# Patient Record
Sex: MALE | Race: WHITE | Employment: UNEMPLOYED | URBAN - METROPOLITAN AREA
[De-identification: names, ages, dates, MRNs, and addresses within clinical notes are randomized per-mention and may not be internally consistent; named-entity substitution may affect disease eponyms.]

---

## 2017-01-01 ENCOUNTER — ANESTHESIA EVENT (OUTPATIENT)
Dept: SURGERY | Facility: HOSPITAL | Age: 62
DRG: 617 | End: 2017-01-01
Payer: MEDICAID

## 2017-01-01 ENCOUNTER — TELEPHONE (OUTPATIENT)
Dept: ENDOCRINOLOGY CLINIC | Facility: CLINIC | Age: 62
End: 2017-01-01

## 2017-01-01 ENCOUNTER — HOSPITAL ENCOUNTER (OUTPATIENT)
Dept: ENDOCRINOLOGY | Facility: HOSPITAL | Age: 62
Discharge: HOME | End: 2017-01-01
Attending: INTERNAL MEDICINE
Payer: MEDICAID

## 2017-01-01 ENCOUNTER — HOSPITAL ENCOUNTER (INPATIENT)
Facility: HOSPITAL | Age: 62
LOS: 17 days | Discharge: NURSING, REHAB OR LONG-TERM CARE | DRG: 617 | End: 2017-01-01
Attending: INTERNAL MEDICINE | Admitting: INTERNAL MEDICINE
Payer: MEDICAID

## 2017-01-01 ENCOUNTER — OFFICE VISIT (OUTPATIENT)
Dept: ENDOCRINOLOGY CLINIC | Facility: CLINIC | Age: 62
End: 2017-01-01

## 2017-01-01 ENCOUNTER — TELEPHONE (OUTPATIENT)
Dept: PULMONOLOGY | Facility: CLINIC | Age: 62
End: 2017-01-01

## 2017-01-01 ENCOUNTER — HOSPITAL ENCOUNTER (INPATIENT)
Facility: HOSPITAL | Age: 62
LOS: 4 days | Discharge: HOME | DRG: 638 | End: 2017-01-01
Attending: EMERGENCY MEDICINE | Admitting: INTERNAL MEDICINE
Payer: MEDICAID

## 2017-01-01 ENCOUNTER — APPOINTMENT (OUTPATIENT)
Dept: LAB | Facility: HOSPITAL | Age: 62
End: 2017-01-01
Attending: INTERNAL MEDICINE
Payer: MEDICAID

## 2017-01-01 ENCOUNTER — TELEPHONE (OUTPATIENT)
Dept: CARDIOLOGY UNIT | Facility: HOSPITAL | Age: 62
End: 2017-01-01

## 2017-01-01 ENCOUNTER — APPOINTMENT (OUTPATIENT)
Dept: GENERAL RADIOLOGY | Facility: HOSPITAL | Age: 62
DRG: 617 | End: 2017-01-01
Attending: INTERNAL MEDICINE
Payer: MEDICAID

## 2017-01-01 ENCOUNTER — APPOINTMENT (OUTPATIENT)
Dept: MRI IMAGING | Facility: HOSPITAL | Age: 62
DRG: 617 | End: 2017-01-01
Attending: INTERNAL MEDICINE
Payer: MEDICAID

## 2017-01-01 ENCOUNTER — OFFICE VISIT (OUTPATIENT)
Dept: PULMONOLOGY | Facility: CLINIC | Age: 62
End: 2017-01-01

## 2017-01-01 ENCOUNTER — ANESTHESIA (OUTPATIENT)
Dept: SURGERY | Facility: HOSPITAL | Age: 62
DRG: 617 | End: 2017-01-01
Payer: MEDICAID

## 2017-01-01 ENCOUNTER — NURSE ONLY (OUTPATIENT)
Dept: ENDOCRINOLOGY CLINIC | Facility: CLINIC | Age: 62
End: 2017-01-01

## 2017-01-01 ENCOUNTER — SURGERY (OUTPATIENT)
Age: 62
End: 2017-01-01

## 2017-01-01 ENCOUNTER — APPOINTMENT (OUTPATIENT)
Dept: GENERAL RADIOLOGY | Facility: HOSPITAL | Age: 62
DRG: 617 | End: 2017-01-01
Attending: CLINICAL NURSE SPECIALIST
Payer: MEDICAID

## 2017-01-01 VITALS
BODY MASS INDEX: 18.51 KG/M2 | DIASTOLIC BLOOD PRESSURE: 73 MMHG | HEIGHT: 78 IN | HEART RATE: 85 BPM | SYSTOLIC BLOOD PRESSURE: 133 MMHG | WEIGHT: 160 LBS

## 2017-01-01 VITALS
BODY MASS INDEX: 18.82 KG/M2 | SYSTOLIC BLOOD PRESSURE: 111 MMHG | DIASTOLIC BLOOD PRESSURE: 56 MMHG | OXYGEN SATURATION: 97 % | HEIGHT: 78 IN | HEART RATE: 84 BPM | RESPIRATION RATE: 20 BRPM | WEIGHT: 162.69 LBS | TEMPERATURE: 98 F

## 2017-01-01 VITALS — HEIGHT: 78 IN | BODY MASS INDEX: 16.78 KG/M2 | WEIGHT: 145 LBS

## 2017-01-01 VITALS
HEART RATE: 94 BPM | HEIGHT: 78 IN | BODY MASS INDEX: 19.16 KG/M2 | WEIGHT: 165.63 LBS | DIASTOLIC BLOOD PRESSURE: 58 MMHG | RESPIRATION RATE: 18 BRPM | OXYGEN SATURATION: 98 % | SYSTOLIC BLOOD PRESSURE: 112 MMHG

## 2017-01-01 VITALS
DIASTOLIC BLOOD PRESSURE: 59 MMHG | HEIGHT: 78 IN | WEIGHT: 145 LBS | HEART RATE: 87 BPM | RESPIRATION RATE: 16 BRPM | BODY MASS INDEX: 16.78 KG/M2 | TEMPERATURE: 98 F | SYSTOLIC BLOOD PRESSURE: 127 MMHG | OXYGEN SATURATION: 97 %

## 2017-01-01 VITALS
BODY MASS INDEX: 18.03 KG/M2 | WEIGHT: 155.81 LBS | OXYGEN SATURATION: 99 % | HEIGHT: 78 IN | RESPIRATION RATE: 19 BRPM | HEART RATE: 65 BPM | DIASTOLIC BLOOD PRESSURE: 68 MMHG | SYSTOLIC BLOOD PRESSURE: 111 MMHG

## 2017-01-01 VITALS
HEIGHT: 78 IN | DIASTOLIC BLOOD PRESSURE: 53 MMHG | RESPIRATION RATE: 18 BRPM | HEART RATE: 96 BPM | SYSTOLIC BLOOD PRESSURE: 100 MMHG | OXYGEN SATURATION: 97 %

## 2017-01-01 VITALS — BODY MASS INDEX: 18 KG/M2 | WEIGHT: 156.88 LBS

## 2017-01-01 VITALS — WEIGHT: 155.81 LBS | HEIGHT: 78 IN | BODY MASS INDEX: 18.03 KG/M2

## 2017-01-01 VITALS
BODY MASS INDEX: 16.78 KG/M2 | DIASTOLIC BLOOD PRESSURE: 68 MMHG | SYSTOLIC BLOOD PRESSURE: 115 MMHG | HEIGHT: 78 IN | WEIGHT: 145 LBS | HEART RATE: 111 BPM

## 2017-01-01 DIAGNOSIS — E10.649 UNCONTROLLED TYPE 1 DIABETES MELLITUS WITH HYPOGLYCEMIA WITHOUT COMA (HCC): ICD-10-CM

## 2017-01-01 DIAGNOSIS — Z12.5 SCREENING FOR PROSTATE CANCER: ICD-10-CM

## 2017-01-01 DIAGNOSIS — E05.90 HYPERTHYROIDISM: ICD-10-CM

## 2017-01-01 DIAGNOSIS — R79.89 ELEVATED TSH: Primary | ICD-10-CM

## 2017-01-01 DIAGNOSIS — L97.419 DIABETIC ULCER OF RIGHT MIDFOOT ASSOCIATED WITH TYPE 1 DIABETES MELLITUS, UNSPECIFIED ULCER STAGE (HCC): Primary | ICD-10-CM

## 2017-01-01 DIAGNOSIS — E10.65 TYPE 1 DIABETES MELLITUS WITH HYPERGLYCEMIA (HCC): Primary | ICD-10-CM

## 2017-01-01 DIAGNOSIS — E10.621 DIABETIC ULCER OF RIGHT MIDFOOT ASSOCIATED WITH TYPE 1 DIABETES MELLITUS, UNSPECIFIED ULCER STAGE (HCC): Primary | ICD-10-CM

## 2017-01-01 DIAGNOSIS — E10.10 TYPE 1 DIABETES MELLITUS WITH KETOACIDOSIS WITHOUT COMA (HCC): ICD-10-CM

## 2017-01-01 DIAGNOSIS — E10.65 TYPE 1 DIABETES MELLITUS WITH HYPERGLYCEMIA (HCC): ICD-10-CM

## 2017-01-01 DIAGNOSIS — R79.89 ELEVATED TSH: ICD-10-CM

## 2017-01-01 DIAGNOSIS — E05.90 HYPERTHYROIDISM: Primary | ICD-10-CM

## 2017-01-01 DIAGNOSIS — R73.9 HYPERGLYCEMIA: Primary | ICD-10-CM

## 2017-01-01 DIAGNOSIS — E11.621 DIABETIC ULCER OF RIGHT MIDFOOT ASSOCIATED WITH TYPE 2 DIABETES MELLITUS, UNSPECIFIED ULCER STAGE (HCC): Primary | ICD-10-CM

## 2017-01-01 DIAGNOSIS — L97.419 DIABETIC ULCER OF RIGHT MIDFOOT ASSOCIATED WITH TYPE 2 DIABETES MELLITUS, UNSPECIFIED ULCER STAGE (HCC): Primary | ICD-10-CM

## 2017-01-01 DIAGNOSIS — E10.649 TYPE 1 DIABETES MELLITUS WITH HYPOGLYCEMIA AND WITHOUT COMA (HCC): Primary | ICD-10-CM

## 2017-01-01 DIAGNOSIS — E11.65 UNCONTROLLED TYPE 2 DIABETES MELLITUS WITH HYPERGLYCEMIA, WITH LONG-TERM CURRENT USE OF INSULIN (HCC): Primary | ICD-10-CM

## 2017-01-01 DIAGNOSIS — E11.65 UNCONTROLLED TYPE 2 DIABETES MELLITUS WITH HYPERGLYCEMIA, WITH LONG-TERM CURRENT USE OF INSULIN (HCC): ICD-10-CM

## 2017-01-01 DIAGNOSIS — E10.65 UNCONTROLLED TYPE 1 DIABETES MELLITUS WITH HYPERGLYCEMIA (HCC): Primary | ICD-10-CM

## 2017-01-01 DIAGNOSIS — Z79.4 UNCONTROLLED TYPE 2 DIABETES MELLITUS WITH HYPERGLYCEMIA, WITH LONG-TERM CURRENT USE OF INSULIN (HCC): ICD-10-CM

## 2017-01-01 DIAGNOSIS — E03.9 HYPOTHYROIDISM, UNSPECIFIED TYPE: Primary | ICD-10-CM

## 2017-01-01 DIAGNOSIS — Z79.4 UNCONTROLLED TYPE 2 DIABETES MELLITUS WITH HYPERGLYCEMIA, WITH LONG-TERM CURRENT USE OF INSULIN (HCC): Primary | ICD-10-CM

## 2017-01-01 DIAGNOSIS — M86.171: ICD-10-CM

## 2017-01-01 DIAGNOSIS — E03.9 HYPOTHYROIDISM, UNSPECIFIED TYPE: ICD-10-CM

## 2017-01-01 LAB
ALBUMIN SERPL BCP-MCNC: 2 G/DL (ref 3.5–4.8)
ALBUMIN/GLOB SERPL: 0.5 {RATIO} (ref 1–2)
ALP SERPL-CCNC: 176 U/L (ref 32–100)
ALT SERPL-CCNC: 14 U/L (ref 17–63)
ANION GAP SERPL CALC-SCNC: 12 MMOL/L (ref 0–18)
ANION GAP SERPL CALC-SCNC: 15 MMOL/L (ref 0–18)
ANION GAP SERPL CALC-SCNC: 21 MMOL/L (ref 0–18)
ANION GAP SERPL CALC-SCNC: 5 MMOL/L (ref 0–18)
ANION GAP SERPL CALC-SCNC: 7 MMOL/L (ref 0–18)
ANION GAP SERPL CALC-SCNC: 7 MMOL/L (ref 0–18)
ANION GAP SERPL CALC-SCNC: 8 MMOL/L (ref 0–18)
AST SERPL-CCNC: 23 U/L (ref 15–41)
BASOPHILS # BLD: 0 K/UL (ref 0–0.2)
BASOPHILS # BLD: 0.1 K/UL (ref 0–0.2)
BASOPHILS # BLD: 0.2 K/UL (ref 0–0.2)
BASOPHILS # BLD: 0.2 K/UL (ref 0–0.2)
BASOPHILS NFR BLD: 0 %
BASOPHILS NFR BLD: 1 %
BILIRUB SERPL-MCNC: 1 MG/DL (ref 0.3–1.2)
BUN SERPL-MCNC: 14 MG/DL (ref 8–20)
BUN SERPL-MCNC: 14 MG/DL (ref 8–20)
BUN SERPL-MCNC: 15 MG/DL (ref 8–20)
BUN SERPL-MCNC: 17 MG/DL (ref 8–20)
BUN SERPL-MCNC: 18 MG/DL (ref 8–20)
BUN SERPL-MCNC: 19 MG/DL (ref 8–20)
BUN SERPL-MCNC: 21 MG/DL (ref 8–20)
BUN SERPL-MCNC: 22 MG/DL (ref 8–20)
BUN SERPL-MCNC: 23 MG/DL (ref 8–20)
BUN SERPL-MCNC: 32 MG/DL (ref 8–20)
BUN SERPL-MCNC: 37 MG/DL (ref 8–20)
BUN/CREAT SERPL: 15.3 (ref 10–20)
BUN/CREAT SERPL: 15.8 (ref 10–20)
BUN/CREAT SERPL: 17.9 (ref 10–20)
BUN/CREAT SERPL: 18.5 (ref 10–20)
BUN/CREAT SERPL: 18.9 (ref 10–20)
BUN/CREAT SERPL: 19.4 (ref 10–20)
BUN/CREAT SERPL: 19.5 (ref 10–20)
BUN/CREAT SERPL: 20.5 (ref 10–20)
BUN/CREAT SERPL: 21.6 (ref 10–20)
BUN/CREAT SERPL: 22.9 (ref 10–20)
BUN/CREAT SERPL: 23.4 (ref 10–20)
BUN/CREAT SERPL: 25 (ref 10–20)
BUN/CREAT SERPL: 29.8 (ref 10–20)
CALCIUM SERPL-MCNC: 7.8 MG/DL (ref 8.5–10.5)
CALCIUM SERPL-MCNC: 8 MG/DL (ref 8.5–10.5)
CALCIUM SERPL-MCNC: 8 MG/DL (ref 8.5–10.5)
CALCIUM SERPL-MCNC: 8.1 MG/DL (ref 8.5–10.5)
CALCIUM SERPL-MCNC: 8.2 MG/DL (ref 8.5–10.5)
CALCIUM SERPL-MCNC: 8.3 MG/DL (ref 8.5–10.5)
CALCIUM SERPL-MCNC: 8.7 MG/DL (ref 8.5–10.5)
CALCIUM SERPL-MCNC: 9.1 MG/DL (ref 8.5–10.5)
CALCIUM SERPL-MCNC: 9.2 MG/DL (ref 8.5–10.5)
CHLORIDE SERPL-SCNC: 100 MMOL/L (ref 95–110)
CHLORIDE SERPL-SCNC: 100 MMOL/L (ref 95–110)
CHLORIDE SERPL-SCNC: 102 MMOL/L (ref 95–110)
CHLORIDE SERPL-SCNC: 110 MMOL/L (ref 95–110)
CHLORIDE SERPL-SCNC: 95 MMOL/L (ref 95–110)
CHLORIDE SERPL-SCNC: 96 MMOL/L (ref 95–110)
CHLORIDE SERPL-SCNC: 98 MMOL/L (ref 95–110)
CHLORIDE SERPL-SCNC: 99 MMOL/L (ref 95–110)
CO2 SERPL-SCNC: 12 MMOL/L (ref 22–32)
CO2 SERPL-SCNC: 15 MMOL/L (ref 22–32)
CO2 SERPL-SCNC: 25 MMOL/L (ref 22–32)
CO2 SERPL-SCNC: 25 MMOL/L (ref 22–32)
CO2 SERPL-SCNC: 28 MMOL/L (ref 22–32)
CO2 SERPL-SCNC: 28 MMOL/L (ref 22–32)
CO2 SERPL-SCNC: 29 MMOL/L (ref 22–32)
CO2 SERPL-SCNC: 29 MMOL/L (ref 22–32)
CO2 SERPL-SCNC: 30 MMOL/L (ref 22–32)
CO2 SERPL-SCNC: 31 MMOL/L (ref 22–32)
CO2 SERPL-SCNC: 31 MMOL/L (ref 22–32)
CO2 SERPL-SCNC: 32 MMOL/L (ref 22–32)
CO2 SERPL-SCNC: 33 MMOL/L (ref 22–32)
CREAT SERPL-MCNC: 0.72 MG/DL (ref 0.5–1.5)
CREAT SERPL-MCNC: 0.73 MG/DL (ref 0.5–1.5)
CREAT SERPL-MCNC: 0.74 MG/DL (ref 0.5–1.5)
CREAT SERPL-MCNC: 0.77 MG/DL (ref 0.5–1.5)
CREAT SERPL-MCNC: 0.83 MG/DL (ref 0.5–1.5)
CREAT SERPL-MCNC: 0.84 MG/DL (ref 0.5–1.5)
CREAT SERPL-MCNC: 0.87 MG/DL (ref 0.5–1.5)
CREAT SERPL-MCNC: 0.88 MG/DL (ref 0.5–1.5)
CREAT SERPL-MCNC: 0.98 MG/DL (ref 0.5–1.5)
CREAT SERPL-MCNC: 1.24 MG/DL (ref 0.5–1.5)
CREAT SERPL-MCNC: 1.24 MG/DL (ref 0.5–1.5)
CREAT SERPL-MCNC: 1.33 MG/DL (ref 0.5–1.5)
CREAT SERPL-MCNC: 1.48 MG/DL (ref 0.5–1.5)
CREAT UR-MCNC: 29 MG/DL
EOSINOPHIL # BLD: 0 K/UL (ref 0–0.7)
EOSINOPHIL # BLD: 0.1 K/UL (ref 0–0.7)
EOSINOPHIL # BLD: 0.2 K/UL (ref 0–0.7)
EOSINOPHIL # BLD: 0.3 K/UL (ref 0–0.7)
EOSINOPHIL # BLD: 0.3 K/UL (ref 0–0.7)
EOSINOPHIL # BLD: 0.4 K/UL (ref 0–0.7)
EOSINOPHIL # BLD: 0.5 K/UL (ref 0–0.7)
EOSINOPHIL # BLD: 0.6 K/UL (ref 0–0.7)
EOSINOPHIL NFR BLD: 0 %
EOSINOPHIL NFR BLD: 1 %
EOSINOPHIL NFR BLD: 2 %
EOSINOPHIL NFR BLD: 3 %
EOSINOPHIL NFR BLD: 4 %
EOSINOPHIL NFR BLD: 4 %
EOSINOPHIL NFR BLD: 8 %
ERYTHROCYTE [DISTWIDTH] IN BLOOD BY AUTOMATED COUNT: 13.7 % (ref 11–15)
ERYTHROCYTE [DISTWIDTH] IN BLOOD BY AUTOMATED COUNT: 13.9 % (ref 11–15)
ERYTHROCYTE [DISTWIDTH] IN BLOOD BY AUTOMATED COUNT: 14 % (ref 11–15)
ERYTHROCYTE [DISTWIDTH] IN BLOOD BY AUTOMATED COUNT: 14.1 % (ref 11–15)
ERYTHROCYTE [DISTWIDTH] IN BLOOD BY AUTOMATED COUNT: 14.1 % (ref 11–15)
ERYTHROCYTE [DISTWIDTH] IN BLOOD BY AUTOMATED COUNT: 14.2 % (ref 11–15)
ERYTHROCYTE [DISTWIDTH] IN BLOOD BY AUTOMATED COUNT: 14.3 % (ref 11–15)
ERYTHROCYTE [DISTWIDTH] IN BLOOD BY AUTOMATED COUNT: 14.4 % (ref 11–15)
ERYTHROCYTE [DISTWIDTH] IN BLOOD BY AUTOMATED COUNT: 14.4 % (ref 11–15)
ERYTHROCYTE [DISTWIDTH] IN BLOOD BY AUTOMATED COUNT: 14.5 % (ref 11–15)
ERYTHROCYTE [DISTWIDTH] IN BLOOD BY AUTOMATED COUNT: 14.8 % (ref 11–15)
ERYTHROCYTE [DISTWIDTH] IN BLOOD BY AUTOMATED COUNT: 15.2 % (ref 11–15)
GLOBULIN PLAS-MCNC: 4.1 G/DL (ref 2.5–3.7)
GLUCOSE BLDC GLUCOMTR-MCNC: 101 MG/DL (ref 70–99)
GLUCOSE BLDC GLUCOMTR-MCNC: 105 MG/DL (ref 70–99)
GLUCOSE BLDC GLUCOMTR-MCNC: 105 MG/DL (ref 70–99)
GLUCOSE BLDC GLUCOMTR-MCNC: 106 MG/DL (ref 70–99)
GLUCOSE BLDC GLUCOMTR-MCNC: 109 MG/DL (ref 70–99)
GLUCOSE BLDC GLUCOMTR-MCNC: 112 MG/DL (ref 70–99)
GLUCOSE BLDC GLUCOMTR-MCNC: 115 MG/DL (ref 70–99)
GLUCOSE BLDC GLUCOMTR-MCNC: 115 MG/DL (ref 70–99)
GLUCOSE BLDC GLUCOMTR-MCNC: 116 MG/DL (ref 70–99)
GLUCOSE BLDC GLUCOMTR-MCNC: 117 MG/DL (ref 70–99)
GLUCOSE BLDC GLUCOMTR-MCNC: 122 MG/DL (ref 70–99)
GLUCOSE BLDC GLUCOMTR-MCNC: 124 MG/DL (ref 70–99)
GLUCOSE BLDC GLUCOMTR-MCNC: 125 MG/DL (ref 70–99)
GLUCOSE BLDC GLUCOMTR-MCNC: 126 MG/DL (ref 70–99)
GLUCOSE BLDC GLUCOMTR-MCNC: 126 MG/DL (ref 70–99)
GLUCOSE BLDC GLUCOMTR-MCNC: 127 MG/DL (ref 70–99)
GLUCOSE BLDC GLUCOMTR-MCNC: 127 MG/DL (ref 70–99)
GLUCOSE BLDC GLUCOMTR-MCNC: 129 MG/DL (ref 70–99)
GLUCOSE BLDC GLUCOMTR-MCNC: 129 MG/DL (ref 70–99)
GLUCOSE BLDC GLUCOMTR-MCNC: 131 MG/DL (ref 70–99)
GLUCOSE BLDC GLUCOMTR-MCNC: 133 MG/DL (ref 70–99)
GLUCOSE BLDC GLUCOMTR-MCNC: 134 MG/DL (ref 70–99)
GLUCOSE BLDC GLUCOMTR-MCNC: 135 MG/DL (ref 70–99)
GLUCOSE BLDC GLUCOMTR-MCNC: 136 MG/DL (ref 70–99)
GLUCOSE BLDC GLUCOMTR-MCNC: 137 MG/DL (ref 70–99)
GLUCOSE BLDC GLUCOMTR-MCNC: 137 MG/DL (ref 70–99)
GLUCOSE BLDC GLUCOMTR-MCNC: 140 MG/DL (ref 70–99)
GLUCOSE BLDC GLUCOMTR-MCNC: 141 MG/DL (ref 70–99)
GLUCOSE BLDC GLUCOMTR-MCNC: 142 MG/DL (ref 70–99)
GLUCOSE BLDC GLUCOMTR-MCNC: 143 MG/DL (ref 70–99)
GLUCOSE BLDC GLUCOMTR-MCNC: 143 MG/DL (ref 70–99)
GLUCOSE BLDC GLUCOMTR-MCNC: 144 MG/DL (ref 70–99)
GLUCOSE BLDC GLUCOMTR-MCNC: 146 MG/DL (ref 70–99)
GLUCOSE BLDC GLUCOMTR-MCNC: 147 MG/DL (ref 70–99)
GLUCOSE BLDC GLUCOMTR-MCNC: 147 MG/DL (ref 70–99)
GLUCOSE BLDC GLUCOMTR-MCNC: 148 MG/DL (ref 70–99)
GLUCOSE BLDC GLUCOMTR-MCNC: 149 MG/DL (ref 70–99)
GLUCOSE BLDC GLUCOMTR-MCNC: 151 MG/DL (ref 70–99)
GLUCOSE BLDC GLUCOMTR-MCNC: 153 MG/DL (ref 70–99)
GLUCOSE BLDC GLUCOMTR-MCNC: 155 MG/DL (ref 70–99)
GLUCOSE BLDC GLUCOMTR-MCNC: 158 MG/DL (ref 70–99)
GLUCOSE BLDC GLUCOMTR-MCNC: 159 MG/DL (ref 70–99)
GLUCOSE BLDC GLUCOMTR-MCNC: 161 MG/DL (ref 70–99)
GLUCOSE BLDC GLUCOMTR-MCNC: 162 MG/DL (ref 70–99)
GLUCOSE BLDC GLUCOMTR-MCNC: 164 MG/DL (ref 70–99)
GLUCOSE BLDC GLUCOMTR-MCNC: 166 MG/DL (ref 70–99)
GLUCOSE BLDC GLUCOMTR-MCNC: 168 MG/DL (ref 70–99)
GLUCOSE BLDC GLUCOMTR-MCNC: 170 MG/DL (ref 70–99)
GLUCOSE BLDC GLUCOMTR-MCNC: 173 MG/DL (ref 70–99)
GLUCOSE BLDC GLUCOMTR-MCNC: 174 MG/DL (ref 70–99)
GLUCOSE BLDC GLUCOMTR-MCNC: 183 MG/DL (ref 70–99)
GLUCOSE BLDC GLUCOMTR-MCNC: 184 MG/DL (ref 70–99)
GLUCOSE BLDC GLUCOMTR-MCNC: 186 MG/DL (ref 70–99)
GLUCOSE BLDC GLUCOMTR-MCNC: 188 MG/DL (ref 70–99)
GLUCOSE BLDC GLUCOMTR-MCNC: 190 MG/DL (ref 70–99)
GLUCOSE BLDC GLUCOMTR-MCNC: 194 MG/DL (ref 70–99)
GLUCOSE BLDC GLUCOMTR-MCNC: 195 MG/DL (ref 70–99)
GLUCOSE BLDC GLUCOMTR-MCNC: 197 MG/DL (ref 70–99)
GLUCOSE BLDC GLUCOMTR-MCNC: 199 MG/DL (ref 70–99)
GLUCOSE BLDC GLUCOMTR-MCNC: 200 MG/DL (ref 70–99)
GLUCOSE BLDC GLUCOMTR-MCNC: 204 MG/DL (ref 70–99)
GLUCOSE BLDC GLUCOMTR-MCNC: 205 MG/DL (ref 70–99)
GLUCOSE BLDC GLUCOMTR-MCNC: 206 MG/DL (ref 70–99)
GLUCOSE BLDC GLUCOMTR-MCNC: 207 MG/DL (ref 70–99)
GLUCOSE BLDC GLUCOMTR-MCNC: 208 MG/DL (ref 70–99)
GLUCOSE BLDC GLUCOMTR-MCNC: 215 MG/DL (ref 70–99)
GLUCOSE BLDC GLUCOMTR-MCNC: 215 MG/DL (ref 70–99)
GLUCOSE BLDC GLUCOMTR-MCNC: 218 MG/DL (ref 70–99)
GLUCOSE BLDC GLUCOMTR-MCNC: 219 MG/DL (ref 70–99)
GLUCOSE BLDC GLUCOMTR-MCNC: 221 MG/DL (ref 70–99)
GLUCOSE BLDC GLUCOMTR-MCNC: 226 MG/DL (ref 70–99)
GLUCOSE BLDC GLUCOMTR-MCNC: 229 MG/DL (ref 70–99)
GLUCOSE BLDC GLUCOMTR-MCNC: 229 MG/DL (ref 70–99)
GLUCOSE BLDC GLUCOMTR-MCNC: 231 MG/DL (ref 70–99)
GLUCOSE BLDC GLUCOMTR-MCNC: 232 MG/DL (ref 70–99)
GLUCOSE BLDC GLUCOMTR-MCNC: 237 MG/DL (ref 70–99)
GLUCOSE BLDC GLUCOMTR-MCNC: 243 MG/DL (ref 70–99)
GLUCOSE BLDC GLUCOMTR-MCNC: 244 MG/DL (ref 70–99)
GLUCOSE BLDC GLUCOMTR-MCNC: 248 MG/DL (ref 70–99)
GLUCOSE BLDC GLUCOMTR-MCNC: 248 MG/DL (ref 70–99)
GLUCOSE BLDC GLUCOMTR-MCNC: 250 MG/DL (ref 70–99)
GLUCOSE BLDC GLUCOMTR-MCNC: 251 MG/DL (ref 70–99)
GLUCOSE BLDC GLUCOMTR-MCNC: 253 MG/DL (ref 70–99)
GLUCOSE BLDC GLUCOMTR-MCNC: 255 MG/DL (ref 70–99)
GLUCOSE BLDC GLUCOMTR-MCNC: 259 MG/DL (ref 70–99)
GLUCOSE BLDC GLUCOMTR-MCNC: 260 MG/DL (ref 70–99)
GLUCOSE BLDC GLUCOMTR-MCNC: 260 MG/DL (ref 70–99)
GLUCOSE BLDC GLUCOMTR-MCNC: 261 MG/DL (ref 70–99)
GLUCOSE BLDC GLUCOMTR-MCNC: 262 MG/DL (ref 70–99)
GLUCOSE BLDC GLUCOMTR-MCNC: 265 MG/DL (ref 70–99)
GLUCOSE BLDC GLUCOMTR-MCNC: 266 MG/DL (ref 70–99)
GLUCOSE BLDC GLUCOMTR-MCNC: 267 MG/DL (ref 70–99)
GLUCOSE BLDC GLUCOMTR-MCNC: 268 MG/DL (ref 70–99)
GLUCOSE BLDC GLUCOMTR-MCNC: 277 MG/DL (ref 70–99)
GLUCOSE BLDC GLUCOMTR-MCNC: 279 MG/DL (ref 70–99)
GLUCOSE BLDC GLUCOMTR-MCNC: 283 MG/DL (ref 70–99)
GLUCOSE BLDC GLUCOMTR-MCNC: 286 MG/DL (ref 70–99)
GLUCOSE BLDC GLUCOMTR-MCNC: 304 MG/DL (ref 70–99)
GLUCOSE BLDC GLUCOMTR-MCNC: 305 MG/DL (ref 70–99)
GLUCOSE BLDC GLUCOMTR-MCNC: 312 MG/DL (ref 70–99)
GLUCOSE BLDC GLUCOMTR-MCNC: 312 MG/DL (ref 70–99)
GLUCOSE BLDC GLUCOMTR-MCNC: 316 MG/DL (ref 70–99)
GLUCOSE BLDC GLUCOMTR-MCNC: 323 MG/DL (ref 70–99)
GLUCOSE BLDC GLUCOMTR-MCNC: 335 MG/DL (ref 70–99)
GLUCOSE BLDC GLUCOMTR-MCNC: 351 MG/DL (ref 70–99)
GLUCOSE BLDC GLUCOMTR-MCNC: 360 MG/DL (ref 70–99)
GLUCOSE BLDC GLUCOMTR-MCNC: 374 MG/DL (ref 70–99)
GLUCOSE BLDC GLUCOMTR-MCNC: 377 MG/DL (ref 70–99)
GLUCOSE BLDC GLUCOMTR-MCNC: 383 MG/DL (ref 70–99)
GLUCOSE BLDC GLUCOMTR-MCNC: 385 MG/DL (ref 70–99)
GLUCOSE BLDC GLUCOMTR-MCNC: 39 MG/DL (ref 70–99)
GLUCOSE BLDC GLUCOMTR-MCNC: 414 MG/DL (ref 70–99)
GLUCOSE BLDC GLUCOMTR-MCNC: 415 MG/DL (ref 70–99)
GLUCOSE BLDC GLUCOMTR-MCNC: 42 MG/DL (ref 70–99)
GLUCOSE BLDC GLUCOMTR-MCNC: 423 MG/DL (ref 70–99)
GLUCOSE BLDC GLUCOMTR-MCNC: 437 MG/DL (ref 70–99)
GLUCOSE BLDC GLUCOMTR-MCNC: 44 MG/DL (ref 70–99)
GLUCOSE BLDC GLUCOMTR-MCNC: 44 MG/DL (ref 70–99)
GLUCOSE BLDC GLUCOMTR-MCNC: 445 MG/DL (ref 70–99)
GLUCOSE BLDC GLUCOMTR-MCNC: 446 MG/DL (ref 70–99)
GLUCOSE BLDC GLUCOMTR-MCNC: 448 MG/DL (ref 70–99)
GLUCOSE BLDC GLUCOMTR-MCNC: 451 MG/DL (ref 70–99)
GLUCOSE BLDC GLUCOMTR-MCNC: 455 MG/DL (ref 70–99)
GLUCOSE BLDC GLUCOMTR-MCNC: 455 MG/DL (ref 70–99)
GLUCOSE BLDC GLUCOMTR-MCNC: 457 MG/DL (ref 70–99)
GLUCOSE BLDC GLUCOMTR-MCNC: 47 MG/DL (ref 70–99)
GLUCOSE BLDC GLUCOMTR-MCNC: 493 MG/DL (ref 70–99)
GLUCOSE BLDC GLUCOMTR-MCNC: 55 MG/DL (ref 70–99)
GLUCOSE BLDC GLUCOMTR-MCNC: 55 MG/DL (ref 70–99)
GLUCOSE BLDC GLUCOMTR-MCNC: 56 MG/DL (ref 70–99)
GLUCOSE BLDC GLUCOMTR-MCNC: 58 MG/DL (ref 70–99)
GLUCOSE BLDC GLUCOMTR-MCNC: 59 MG/DL (ref 70–99)
GLUCOSE BLDC GLUCOMTR-MCNC: 63 MG/DL (ref 70–99)
GLUCOSE BLDC GLUCOMTR-MCNC: 64 MG/DL (ref 70–99)
GLUCOSE BLDC GLUCOMTR-MCNC: 65 MG/DL (ref 70–99)
GLUCOSE BLDC GLUCOMTR-MCNC: 65 MG/DL (ref 70–99)
GLUCOSE BLDC GLUCOMTR-MCNC: 66 MG/DL (ref 70–99)
GLUCOSE BLDC GLUCOMTR-MCNC: 66 MG/DL (ref 70–99)
GLUCOSE BLDC GLUCOMTR-MCNC: 72 MG/DL (ref 70–99)
GLUCOSE BLDC GLUCOMTR-MCNC: 75 MG/DL (ref 70–99)
GLUCOSE BLDC GLUCOMTR-MCNC: 76 MG/DL (ref 70–99)
GLUCOSE BLDC GLUCOMTR-MCNC: 77 MG/DL (ref 70–99)
GLUCOSE BLDC GLUCOMTR-MCNC: 78 MG/DL (ref 70–99)
GLUCOSE BLDC GLUCOMTR-MCNC: 78 MG/DL (ref 70–99)
GLUCOSE BLDC GLUCOMTR-MCNC: 79 MG/DL (ref 70–99)
GLUCOSE BLDC GLUCOMTR-MCNC: 81 MG/DL (ref 70–99)
GLUCOSE BLDC GLUCOMTR-MCNC: 83 MG/DL (ref 70–99)
GLUCOSE BLDC GLUCOMTR-MCNC: 83 MG/DL (ref 70–99)
GLUCOSE BLDC GLUCOMTR-MCNC: 86 MG/DL (ref 70–99)
GLUCOSE BLDC GLUCOMTR-MCNC: 86 MG/DL (ref 70–99)
GLUCOSE BLDC GLUCOMTR-MCNC: 88 MG/DL (ref 70–99)
GLUCOSE BLDC GLUCOMTR-MCNC: 89 MG/DL (ref 70–99)
GLUCOSE BLDC GLUCOMTR-MCNC: 90 MG/DL (ref 70–99)
GLUCOSE BLDC GLUCOMTR-MCNC: 91 MG/DL (ref 70–99)
GLUCOSE BLDC GLUCOMTR-MCNC: 91 MG/DL (ref 70–99)
GLUCOSE BLDC GLUCOMTR-MCNC: 94 MG/DL (ref 70–99)
GLUCOSE BLDC GLUCOMTR-MCNC: 96 MG/DL (ref 70–99)
GLUCOSE BLDC GLUCOMTR-MCNC: 97 MG/DL (ref 70–99)
GLUCOSE BLDC GLUCOMTR-MCNC: 97 MG/DL (ref 70–99)
GLUCOSE BLDC GLUCOMTR-MCNC: 99 MG/DL (ref 70–99)
GLUCOSE BLDC GLUCOMTR-MCNC: >500 MG/DL (ref 70–99)
GLUCOSE SERPL-MCNC: 112 MG/DL (ref 70–99)
GLUCOSE SERPL-MCNC: 113 MG/DL (ref 70–99)
GLUCOSE SERPL-MCNC: 120 MG/DL (ref 70–99)
GLUCOSE SERPL-MCNC: 156 MG/DL (ref 70–99)
GLUCOSE SERPL-MCNC: 251 MG/DL (ref 70–99)
GLUCOSE SERPL-MCNC: 264 MG/DL (ref 70–99)
GLUCOSE SERPL-MCNC: 295 MG/DL (ref 70–99)
GLUCOSE SERPL-MCNC: 392 MG/DL (ref 70–99)
GLUCOSE SERPL-MCNC: 432 MG/DL (ref 70–99)
GLUCOSE SERPL-MCNC: 451 MG/DL (ref 70–99)
GLUCOSE SERPL-MCNC: 764 MG/DL (ref 70–99)
GLUCOSE SERPL-MCNC: 96 MG/DL (ref 70–99)
GLUCOSE SERPL-MCNC: 99 MG/DL (ref 70–99)
HBA1C MFR BLD: 10.2 % (ref 4–6)
HBA1C MFR BLD: 9.4 % (ref 4–6)
HBA1C MFR BLD: 9.9 % (ref 4–6)
HCT VFR BLD AUTO: 26.6 % (ref 41–52)
HCT VFR BLD AUTO: 26.6 % (ref 41–52)
HCT VFR BLD AUTO: 28.2 % (ref 41–52)
HCT VFR BLD AUTO: 28.3 % (ref 41–52)
HCT VFR BLD AUTO: 28.4 % (ref 41–52)
HCT VFR BLD AUTO: 29.5 % (ref 41–52)
HCT VFR BLD AUTO: 29.7 % (ref 41–52)
HCT VFR BLD AUTO: 31.1 % (ref 41–52)
HCT VFR BLD AUTO: 31.2 % (ref 41–52)
HCT VFR BLD AUTO: 31.6 % (ref 41–52)
HCT VFR BLD AUTO: 31.8 % (ref 41–52)
HCT VFR BLD AUTO: 36.6 % (ref 41–52)
HGB BLD-MCNC: 10.2 G/DL (ref 13.5–17.5)
HGB BLD-MCNC: 10.4 G/DL (ref 13.5–17.5)
HGB BLD-MCNC: 12 G/DL (ref 13.5–17.5)
HGB BLD-MCNC: 8.7 G/DL (ref 13.5–17.5)
HGB BLD-MCNC: 9 G/DL (ref 13.5–17.5)
HGB BLD-MCNC: 9.3 G/DL (ref 13.5–17.5)
HGB BLD-MCNC: 9.3 G/DL (ref 13.5–17.5)
HGB BLD-MCNC: 9.5 G/DL (ref 13.5–17.5)
HGB BLD-MCNC: 9.7 G/DL (ref 13.5–17.5)
HGB BLD-MCNC: 9.8 G/DL (ref 13.5–17.5)
LDLC SERPL DIRECT ASSAY-MCNC: 111 MG/DL (ref 0–99)
LYMPHOCYTES # BLD: 0.4 K/UL (ref 1–4)
LYMPHOCYTES # BLD: 0.5 K/UL (ref 1–4)
LYMPHOCYTES # BLD: 0.7 K/UL (ref 1–4)
LYMPHOCYTES # BLD: 0.7 K/UL (ref 1–4)
LYMPHOCYTES # BLD: 0.9 K/UL (ref 1–4)
LYMPHOCYTES # BLD: 0.9 K/UL (ref 1–4)
LYMPHOCYTES # BLD: 1 K/UL (ref 1–4)
LYMPHOCYTES # BLD: 1.1 K/UL (ref 1–4)
LYMPHOCYTES # BLD: 1.2 K/UL (ref 1–4)
LYMPHOCYTES # BLD: 1.3 K/UL (ref 1–4)
LYMPHOCYTES # BLD: 1.3 K/UL (ref 1–4)
LYMPHOCYTES # BLD: 1.4 K/UL (ref 1–4)
LYMPHOCYTES NFR BLD: 19 %
LYMPHOCYTES NFR BLD: 2 %
LYMPHOCYTES NFR BLD: 2 %
LYMPHOCYTES NFR BLD: 3 %
LYMPHOCYTES NFR BLD: 4 %
LYMPHOCYTES NFR BLD: 5 %
LYMPHOCYTES NFR BLD: 6 %
LYMPHOCYTES NFR BLD: 7 %
LYMPHOCYTES NFR BLD: 8 %
LYMPHOCYTES NFR BLD: 9 %
MCH RBC QN AUTO: 29.1 PG (ref 27–32)
MCH RBC QN AUTO: 29.2 PG (ref 27–32)
MCH RBC QN AUTO: 29.4 PG (ref 27–32)
MCH RBC QN AUTO: 29.5 PG (ref 27–32)
MCH RBC QN AUTO: 29.6 PG (ref 27–32)
MCH RBC QN AUTO: 29.6 PG (ref 27–32)
MCH RBC QN AUTO: 29.7 PG (ref 27–32)
MCH RBC QN AUTO: 30 PG (ref 27–32)
MCHC RBC AUTO-ENTMCNC: 32.7 G/DL (ref 32–37)
MCHC RBC AUTO-ENTMCNC: 32.8 G/DL (ref 32–37)
MCHC RBC AUTO-ENTMCNC: 32.8 G/DL (ref 32–37)
MCHC RBC AUTO-ENTMCNC: 32.9 G/DL (ref 32–37)
MCHC RBC AUTO-ENTMCNC: 33 G/DL (ref 32–37)
MCHC RBC AUTO-ENTMCNC: 33 G/DL (ref 32–37)
MCHC RBC AUTO-ENTMCNC: 33.3 G/DL (ref 32–37)
MCHC RBC AUTO-ENTMCNC: 33.3 G/DL (ref 32–37)
MCHC RBC AUTO-ENTMCNC: 33.8 G/DL (ref 32–37)
MCV RBC AUTO: 88.4 FL (ref 80–100)
MCV RBC AUTO: 88.6 FL (ref 80–100)
MCV RBC AUTO: 88.6 FL (ref 80–100)
MCV RBC AUTO: 88.8 FL (ref 80–100)
MCV RBC AUTO: 88.9 FL (ref 80–100)
MCV RBC AUTO: 88.9 FL (ref 80–100)
MCV RBC AUTO: 89.3 FL (ref 80–100)
MCV RBC AUTO: 89.4 FL (ref 80–100)
MCV RBC AUTO: 89.5 FL (ref 80–100)
MCV RBC AUTO: 89.5 FL (ref 80–100)
MCV RBC AUTO: 89.7 FL (ref 80–100)
MCV RBC AUTO: 90.2 FL (ref 80–100)
METAMYELOCYTES # BLD MANUAL: 0.13 K/UL
METAMYELOCYTES # BLD MANUAL: 0.14 K/UL
METAMYELOCYTES # BLD MANUAL: 0.25 K/UL
METAMYELOCYTES # BLD MANUAL: 0.27 K/UL
METAMYELOCYTES NFR BLD: 2 %
METAMYELOCYTES NFR BLD: 2 %
MICROALBUMIN UR-MCNC: 0.5 MG/DL (ref 0–1.8)
MICROALBUMIN/CREAT UR: 17.2 MG/G{CREAT} (ref 0–20)
MONOCYTES # BLD: 0.4 K/UL (ref 0–1)
MONOCYTES # BLD: 0.6 K/UL (ref 0–1)
MONOCYTES # BLD: 0.7 K/UL (ref 0–1)
MONOCYTES # BLD: 0.8 K/UL (ref 0–1)
MONOCYTES # BLD: 0.9 K/UL (ref 0–1)
MONOCYTES # BLD: 1 K/UL (ref 0–1)
MONOCYTES # BLD: 1.2 K/UL (ref 0–1)
MONOCYTES # BLD: 1.2 K/UL (ref 0–1)
MONOCYTES # BLD: 1.3 K/UL (ref 0–1)
MONOCYTES # BLD: 1.4 K/UL (ref 0–1)
MONOCYTES NFR BLD: 3 %
MONOCYTES NFR BLD: 3 %
MONOCYTES NFR BLD: 5 %
MONOCYTES NFR BLD: 6 %
MONOCYTES NFR BLD: 7 %
MRSA DNA SPEC QL NAA+PROBE: NEGATIVE
MYELOCYTES NFR BLD: 1 %
MYELOCYTES NFR BLD: 1 %
NEUTROPHILS # BLD AUTO: 10.2 K/UL (ref 1.8–7.7)
NEUTROPHILS # BLD AUTO: 11.4 K/UL (ref 1.8–7.7)
NEUTROPHILS # BLD AUTO: 12.2 K/UL (ref 1.8–7.7)
NEUTROPHILS # BLD AUTO: 13.8 K/UL (ref 1.8–7.7)
NEUTROPHILS # BLD AUTO: 14 K/UL (ref 1.8–7.7)
NEUTROPHILS # BLD AUTO: 14.4 K/UL (ref 1.8–7.7)
NEUTROPHILS # BLD AUTO: 19.8 K/UL (ref 1.8–7.7)
NEUTROPHILS # BLD AUTO: 20.6 K/UL (ref 1.8–7.7)
NEUTROPHILS # BLD AUTO: 21.3 K/UL (ref 1.8–7.7)
NEUTROPHILS # BLD AUTO: 23.2 K/UL (ref 1.8–7.7)
NEUTROPHILS # BLD AUTO: 5 K/UL (ref 1.8–7.7)
NEUTROPHILS # BLD AUTO: 9.5 K/UL (ref 1.8–7.7)
NEUTROPHILS NFR BLD: 66 %
NEUTROPHILS NFR BLD: 75 %
NEUTROPHILS NFR BLD: 77 %
NEUTROPHILS NFR BLD: 82 %
NEUTROPHILS NFR BLD: 82 %
NEUTROPHILS NFR BLD: 83 %
NEUTROPHILS NFR BLD: 85 %
NEUTROPHILS NFR BLD: 86 %
NEUTROPHILS NFR BLD: 86 %
NEUTROPHILS NFR BLD: 90 %
NEUTROPHILS NFR BLD: 93 %
NEUTROPHILS NFR BLD: 94 %
NEUTS BAND NFR BLD: 1 %
NEUTS BAND NFR BLD: 10 %
NEUTS BAND NFR BLD: 2 %
NEUTS BAND NFR BLD: 5 %
OSMOLALITY UR CALC.SUM OF ELEC: 284 MOSM/KG (ref 275–295)
OSMOLALITY UR CALC.SUM OF ELEC: 286 MOSM/KG (ref 275–295)
OSMOLALITY UR CALC.SUM OF ELEC: 288 MOSM/KG (ref 275–295)
OSMOLALITY UR CALC.SUM OF ELEC: 288 MOSM/KG (ref 275–295)
OSMOLALITY UR CALC.SUM OF ELEC: 291 MOSM/KG (ref 275–295)
OSMOLALITY UR CALC.SUM OF ELEC: 292 MOSM/KG (ref 275–295)
OSMOLALITY UR CALC.SUM OF ELEC: 293 MOSM/KG (ref 275–295)
OSMOLALITY UR CALC.SUM OF ELEC: 294 MOSM/KG (ref 275–295)
OSMOLALITY UR CALC.SUM OF ELEC: 296 MOSM/KG (ref 275–295)
OSMOLALITY UR CALC.SUM OF ELEC: 299 MOSM/KG (ref 275–295)
OSMOLALITY UR CALC.SUM OF ELEC: 299 MOSM/KG (ref 275–295)
OSMOLALITY UR CALC.SUM OF ELEC: 302 MOSM/KG (ref 275–295)
OSMOLALITY UR CALC.SUM OF ELEC: 312 MOSM/KG (ref 275–295)
PLATELET # BLD AUTO: 219 K/UL (ref 140–400)
PLATELET # BLD AUTO: 330 K/UL (ref 140–400)
PLATELET # BLD AUTO: 352 K/UL (ref 140–400)
PLATELET # BLD AUTO: 362 K/UL (ref 140–400)
PLATELET # BLD AUTO: 422 K/UL (ref 140–400)
PLATELET # BLD AUTO: 452 K/UL (ref 140–400)
PLATELET # BLD AUTO: 503 K/UL (ref 140–400)
PLATELET # BLD AUTO: 567 K/UL (ref 140–400)
PLATELET # BLD AUTO: 598 K/UL (ref 140–400)
PLATELET # BLD AUTO: 649 K/UL (ref 140–400)
PLATELET # BLD AUTO: 667 K/UL (ref 140–400)
PLATELET # BLD AUTO: 684 K/UL (ref 140–400)
PMV BLD AUTO: 10.3 FL (ref 7.4–10.3)
PMV BLD AUTO: 7.3 FL (ref 7.4–10.3)
PMV BLD AUTO: 7.4 FL (ref 7.4–10.3)
PMV BLD AUTO: 7.5 FL (ref 7.4–10.3)
PMV BLD AUTO: 7.5 FL (ref 7.4–10.3)
PMV BLD AUTO: 7.7 FL (ref 7.4–10.3)
PMV BLD AUTO: 7.8 FL (ref 7.4–10.3)
PMV BLD AUTO: 7.9 FL (ref 7.4–10.3)
PMV BLD AUTO: 8.1 FL (ref 7.4–10.3)
PMV BLD AUTO: 8.2 FL (ref 7.4–10.3)
PMV BLD AUTO: 8.4 FL (ref 7.4–10.3)
PMV BLD AUTO: 8.5 FL (ref 7.4–10.3)
POTASSIUM SERPL-SCNC: 3.2 MMOL/L (ref 3.3–5.1)
POTASSIUM SERPL-SCNC: 3.5 MMOL/L (ref 3.3–5.1)
POTASSIUM SERPL-SCNC: 3.5 MMOL/L (ref 3.3–5.1)
POTASSIUM SERPL-SCNC: 3.6 MMOL/L (ref 3.3–5.1)
POTASSIUM SERPL-SCNC: 4 MMOL/L (ref 3.3–5.1)
POTASSIUM SERPL-SCNC: 4 MMOL/L (ref 3.3–5.1)
POTASSIUM SERPL-SCNC: 4.1 MMOL/L (ref 3.3–5.1)
POTASSIUM SERPL-SCNC: 4.3 MMOL/L (ref 3.3–5.1)
POTASSIUM SERPL-SCNC: 4.4 MMOL/L (ref 3.3–5.1)
POTASSIUM SERPL-SCNC: 4.4 MMOL/L (ref 3.3–5.1)
POTASSIUM SERPL-SCNC: 4.8 MMOL/L (ref 3.3–5.1)
POTASSIUM SERPL-SCNC: 4.8 MMOL/L (ref 3.3–5.1)
POTASSIUM SERPL-SCNC: 5.2 MMOL/L (ref 3.3–5.1)
PROT SERPL-MCNC: 6.1 G/DL (ref 5.9–8.4)
RBC # BLD AUTO: 2.97 M/UL (ref 4.5–5.9)
RBC # BLD AUTO: 3.01 M/UL (ref 4.5–5.9)
RBC # BLD AUTO: 3.14 M/UL (ref 4.5–5.9)
RBC # BLD AUTO: 3.17 M/UL (ref 4.5–5.9)
RBC # BLD AUTO: 3.21 M/UL (ref 4.5–5.9)
RBC # BLD AUTO: 3.3 M/UL (ref 4.5–5.9)
RBC # BLD AUTO: 3.35 M/UL (ref 4.5–5.9)
RBC # BLD AUTO: 3.48 M/UL (ref 4.5–5.9)
RBC # BLD AUTO: 3.5 M/UL (ref 4.5–5.9)
RBC # BLD AUTO: 3.52 M/UL (ref 4.5–5.9)
RBC # BLD AUTO: 3.58 M/UL (ref 4.5–5.9)
RBC # BLD AUTO: 4.12 M/UL (ref 4.5–5.9)
SODIUM SERPL-SCNC: 129 MMOL/L (ref 136–144)
SODIUM SERPL-SCNC: 132 MMOL/L (ref 136–144)
SODIUM SERPL-SCNC: 132 MMOL/L (ref 136–144)
SODIUM SERPL-SCNC: 135 MMOL/L (ref 136–144)
SODIUM SERPL-SCNC: 136 MMOL/L (ref 136–144)
SODIUM SERPL-SCNC: 136 MMOL/L (ref 136–144)
SODIUM SERPL-SCNC: 137 MMOL/L (ref 136–144)
SODIUM SERPL-SCNC: 137 MMOL/L (ref 136–144)
SODIUM SERPL-SCNC: 138 MMOL/L (ref 136–144)
SODIUM SERPL-SCNC: 140 MMOL/L (ref 136–144)
T3FREE SERPL-MCNC: 2.95 PG/ML (ref 2.53–4.29)
T3FREE SERPL-MCNC: 3 PG/ML (ref 2.53–4.29)
T4 FREE SERPL-MCNC: 0.64 NG/DL (ref 0.58–1.64)
T4 FREE SERPL-MCNC: 0.91 NG/DL (ref 0.58–1.64)
TEST STRIP LOT #: NORMAL NUMERIC
TSH SERPL-ACNC: 20.91 UIU/ML (ref 0.45–5.33)
TSH SERPL-ACNC: 39.52 UIU/ML (ref 0.45–5.33)
TSH SERPL-ACNC: 6.03 UIU/ML (ref 0.45–5.33)
WBC # BLD AUTO: 12.3 K/UL (ref 4–11)
WBC # BLD AUTO: 12.5 K/UL (ref 4–11)
WBC # BLD AUTO: 13.3 K/UL (ref 4–11)
WBC # BLD AUTO: 14.5 K/UL (ref 4–11)
WBC # BLD AUTO: 16.1 K/UL (ref 4–11)
WBC # BLD AUTO: 16.1 K/UL (ref 4–11)
WBC # BLD AUTO: 17.5 K/UL (ref 4–11)
WBC # BLD AUTO: 22 K/UL (ref 4–11)
WBC # BLD AUTO: 22.7 K/UL (ref 4–11)
WBC # BLD AUTO: 22.8 K/UL (ref 4–11)
WBC # BLD AUTO: 25.1 K/UL (ref 4–11)
WBC # BLD AUTO: 7.6 K/UL (ref 4–11)

## 2017-01-01 PROCEDURE — 84481 FREE ASSAY (FT-3): CPT

## 2017-01-01 PROCEDURE — 99232 SBSQ HOSP IP/OBS MODERATE 35: CPT | Performed by: INTERNAL MEDICINE

## 2017-01-01 PROCEDURE — 36416 COLLJ CAPILLARY BLOOD SPEC: CPT | Performed by: INTERNAL MEDICINE

## 2017-01-01 PROCEDURE — 84439 ASSAY OF FREE THYROXINE: CPT

## 2017-01-01 PROCEDURE — 99233 SBSQ HOSP IP/OBS HIGH 50: CPT | Performed by: INTERNAL MEDICINE

## 2017-01-01 PROCEDURE — 73720 MRI LWR EXTREMITY W/O&W/DYE: CPT | Performed by: INTERNAL MEDICINE

## 2017-01-01 PROCEDURE — 99238 HOSP IP/OBS DSCHRG MGMT 30/<: CPT | Performed by: INTERNAL MEDICINE

## 2017-01-01 PROCEDURE — 99222 1ST HOSP IP/OBS MODERATE 55: CPT | Performed by: INTERNAL MEDICINE

## 2017-01-01 PROCEDURE — 82962 GLUCOSE BLOOD TEST: CPT | Performed by: INTERNAL MEDICINE

## 2017-01-01 PROCEDURE — 02HV33Z INSERTION OF INFUSION DEVICE INTO SUPERIOR VENA CAVA, PERCUTANEOUS APPROACH: ICD-10-PCS | Performed by: ORTHOPAEDIC SURGERY

## 2017-01-01 PROCEDURE — 99211 OFF/OP EST MAY X REQ PHY/QHP: CPT

## 2017-01-01 PROCEDURE — 99212 OFFICE O/P EST SF 10 MIN: CPT | Performed by: INTERNAL MEDICINE

## 2017-01-01 PROCEDURE — 84443 ASSAY THYROID STIM HORMONE: CPT

## 2017-01-01 PROCEDURE — 95250 CONT GLUC MNTR PHYS/QHP EQP: CPT | Performed by: INTERNAL MEDICINE

## 2017-01-01 PROCEDURE — 99213 OFFICE O/P EST LOW 20 MIN: CPT | Performed by: INTERNAL MEDICINE

## 2017-01-01 PROCEDURE — 82043 UR ALBUMIN QUANTITATIVE: CPT

## 2017-01-01 PROCEDURE — 36415 COLL VENOUS BLD VENIPUNCTURE: CPT

## 2017-01-01 PROCEDURE — 99254 IP/OBS CNSLTJ NEW/EST MOD 60: CPT | Performed by: INTERNAL MEDICINE

## 2017-01-01 PROCEDURE — 99253 IP/OBS CNSLTJ NEW/EST LOW 45: CPT | Performed by: PLASTIC SURGERY

## 2017-01-01 PROCEDURE — 0QBN0ZZ EXCISION OF RIGHT METATARSAL, OPEN APPROACH: ICD-10-PCS | Performed by: PODIATRIST

## 2017-01-01 PROCEDURE — 95251 CONT GLUC MNTR ANALYSIS I&R: CPT | Performed by: INTERNAL MEDICINE

## 2017-01-01 PROCEDURE — 73630 X-RAY EXAM OF FOOT: CPT | Performed by: INTERNAL MEDICINE

## 2017-01-01 PROCEDURE — 99214 OFFICE O/P EST MOD 30 MIN: CPT | Performed by: INTERNAL MEDICINE

## 2017-01-01 PROCEDURE — B5181ZA FLUOROSCOPY OF SUPERIOR VENA CAVA USING LOW OSMOLAR CONTRAST, GUIDANCE: ICD-10-PCS | Performed by: ORTHOPAEDIC SURGERY

## 2017-01-01 PROCEDURE — 99212 OFFICE O/P EST SF 10 MIN: CPT

## 2017-01-01 PROCEDURE — 99223 1ST HOSP IP/OBS HIGH 75: CPT | Performed by: INTERNAL MEDICINE

## 2017-01-01 PROCEDURE — 80048 BASIC METABOLIC PNL TOTAL CA: CPT

## 2017-01-01 PROCEDURE — 0Y6H0Z3 DETACHMENT AT RIGHT LOWER LEG, LOW, OPEN APPROACH: ICD-10-PCS | Performed by: ORTHOPAEDIC SURGERY

## 2017-01-01 PROCEDURE — 83036 HEMOGLOBIN GLYCOSYLATED A1C: CPT | Performed by: INTERNAL MEDICINE

## 2017-01-01 PROCEDURE — 82570 ASSAY OF URINE CREATININE: CPT

## 2017-01-01 PROCEDURE — 71010 XR CHEST AP PORTABLE  (CPT=71010): CPT | Performed by: CLINICAL NURSE SPECIALIST

## 2017-01-01 PROCEDURE — 0J9Q0ZZ DRAINAGE OF RIGHT FOOT SUBCUTANEOUS TISSUE AND FASCIA, OPEN APPROACH: ICD-10-PCS | Performed by: PODIATRIST

## 2017-01-01 PROCEDURE — 83721 ASSAY OF BLOOD LIPOPROTEIN: CPT

## 2017-01-01 RX ORDER — ENOXAPARIN SODIUM 100 MG/ML
40 INJECTION SUBCUTANEOUS DAILY
Status: DISCONTINUED | OUTPATIENT
Start: 2017-01-01 | End: 2017-01-01

## 2017-01-01 RX ORDER — SODIUM CHLORIDE, SODIUM LACTATE, POTASSIUM CHLORIDE, CALCIUM CHLORIDE 600; 310; 30; 20 MG/100ML; MG/100ML; MG/100ML; MG/100ML
INJECTION, SOLUTION INTRAVENOUS CONTINUOUS
Status: DISCONTINUED | OUTPATIENT
Start: 2017-01-01 | End: 2017-01-01

## 2017-01-01 RX ORDER — HALOPERIDOL 5 MG/ML
0.25 INJECTION INTRAMUSCULAR ONCE AS NEEDED
Status: ACTIVE | OUTPATIENT
Start: 2017-01-01 | End: 2017-01-01

## 2017-01-01 RX ORDER — HYDROMORPHONE HYDROCHLORIDE 1 MG/ML
0.2 INJECTION, SOLUTION INTRAMUSCULAR; INTRAVENOUS; SUBCUTANEOUS EVERY 5 MIN PRN
Status: DISCONTINUED | OUTPATIENT
Start: 2017-01-01 | End: 2017-01-01 | Stop reason: HOSPADM

## 2017-01-01 RX ORDER — PEN NEEDLE, DIABETIC 30 GX3/16"
4 NEEDLE, DISPOSABLE MISCELLANEOUS DAILY
Qty: 400 EACH | Refills: 0 | Status: SHIPPED | OUTPATIENT
Start: 2017-01-01

## 2017-01-01 RX ORDER — MORPHINE SULFATE 4 MG/ML
6 INJECTION, SOLUTION INTRAMUSCULAR; INTRAVENOUS EVERY 10 MIN PRN
Status: DISCONTINUED | OUTPATIENT
Start: 2017-01-01 | End: 2017-01-01

## 2017-01-01 RX ORDER — FAMOTIDINE 20 MG/1
20 TABLET ORAL 2 TIMES DAILY
Status: DISCONTINUED | OUTPATIENT
Start: 2017-01-01 | End: 2017-01-01

## 2017-01-01 RX ORDER — HYDRALAZINE HYDROCHLORIDE 20 MG/ML
10 INJECTION INTRAMUSCULAR; INTRAVENOUS EVERY 4 HOURS PRN
Status: DISCONTINUED | OUTPATIENT
Start: 2017-01-01 | End: 2017-01-01

## 2017-01-01 RX ORDER — POTASSIUM CHLORIDE 20 MEQ/1
40 TABLET, EXTENDED RELEASE ORAL EVERY 4 HOURS
Status: COMPLETED | OUTPATIENT
Start: 2017-01-01 | End: 2017-01-01

## 2017-01-01 RX ORDER — DIPHENHYDRAMINE HYDROCHLORIDE 50 MG/ML
12.5 INJECTION INTRAMUSCULAR; INTRAVENOUS EVERY 4 HOURS PRN
Status: DISCONTINUED | OUTPATIENT
Start: 2017-01-01 | End: 2017-01-01

## 2017-01-01 RX ORDER — LIDOCAINE HYDROCHLORIDE 10 MG/ML
INJECTION, SOLUTION EPIDURAL; INFILTRATION; INTRACAUDAL; PERINEURAL AS NEEDED
Status: DISCONTINUED | OUTPATIENT
Start: 2017-01-01 | End: 2017-01-01 | Stop reason: SURG

## 2017-01-01 RX ORDER — BLOOD-GLUCOSE METER
1 KIT MISCELLANEOUS DAILY
Qty: 1 KIT | Refills: 0 | Status: SHIPPED | OUTPATIENT
Start: 2017-01-01 | End: 2017-01-01

## 2017-01-01 RX ORDER — SODIUM CHLORIDE, SODIUM LACTATE, POTASSIUM CHLORIDE, CALCIUM CHLORIDE 600; 310; 30; 20 MG/100ML; MG/100ML; MG/100ML; MG/100ML
INJECTION, SOLUTION INTRAVENOUS CONTINUOUS PRN
Status: DISCONTINUED | OUTPATIENT
Start: 2017-01-01 | End: 2017-01-01 | Stop reason: SURG

## 2017-01-01 RX ORDER — HYDROCODONE BITARTRATE AND ACETAMINOPHEN 5; 325 MG/1; MG/1
1 TABLET ORAL AS NEEDED
Status: DISCONTINUED | OUTPATIENT
Start: 2017-01-01 | End: 2017-01-01

## 2017-01-01 RX ORDER — SODIUM CHLORIDE 450 MG/100ML
INJECTION, SOLUTION INTRAVENOUS CONTINUOUS
Status: DISCONTINUED | OUTPATIENT
Start: 2017-01-01 | End: 2017-01-01

## 2017-01-01 RX ORDER — GLUCOSAM/CHON-MSM1/C/MANG/BOSW 500-416.6
TABLET ORAL
Qty: 900 EACH | Refills: 0 | Status: SHIPPED | OUTPATIENT
Start: 2017-01-01

## 2017-01-01 RX ORDER — MORPHINE SULFATE 4 MG/ML
6 INJECTION, SOLUTION INTRAMUSCULAR; INTRAVENOUS EVERY 10 MIN PRN
Status: DISCONTINUED | OUTPATIENT
Start: 2017-01-01 | End: 2017-01-01 | Stop reason: HOSPADM

## 2017-01-01 RX ORDER — MIDAZOLAM HYDROCHLORIDE 1 MG/ML
INJECTION INTRAMUSCULAR; INTRAVENOUS AS NEEDED
Status: DISCONTINUED | OUTPATIENT
Start: 2017-01-01 | End: 2017-01-01 | Stop reason: SURG

## 2017-01-01 RX ORDER — BISACODYL 10 MG
10 SUPPOSITORY, RECTAL RECTAL
Status: DISCONTINUED | OUTPATIENT
Start: 2017-01-01 | End: 2017-01-01

## 2017-01-01 RX ORDER — LEVOFLOXACIN 500 MG/1
500 TABLET, FILM COATED ORAL DAILY
Qty: 7 TABLET | Refills: 0 | Status: ON HOLD | OUTPATIENT
Start: 2017-01-01 | End: 2017-01-01

## 2017-01-01 RX ORDER — POLYETHYLENE GLYCOL 3350 17 G/17G
17 POWDER, FOR SOLUTION ORAL DAILY PRN
Status: DISCONTINUED | OUTPATIENT
Start: 2017-01-01 | End: 2017-01-01

## 2017-01-01 RX ORDER — DEXTROSE AND SODIUM CHLORIDE 5; .45 G/100ML; G/100ML
INJECTION, SOLUTION INTRAVENOUS CONTINUOUS
Status: DISCONTINUED | OUTPATIENT
Start: 2017-01-01 | End: 2017-01-01

## 2017-01-01 RX ORDER — NALOXONE HYDROCHLORIDE 0.4 MG/ML
0.08 INJECTION, SOLUTION INTRAMUSCULAR; INTRAVENOUS; SUBCUTANEOUS
Status: DISCONTINUED | OUTPATIENT
Start: 2017-01-01 | End: 2017-01-01

## 2017-01-01 RX ORDER — METHIMAZOLE 10 MG/1
5 TABLET ORAL 3 TIMES DAILY
Status: DISCONTINUED | OUTPATIENT
Start: 2017-01-01 | End: 2017-01-01

## 2017-01-01 RX ORDER — PANTOPRAZOLE SODIUM 40 MG/1
40 TABLET, DELAYED RELEASE ORAL
Qty: 40 TABLET | Refills: 6 | Status: ON HOLD | OUTPATIENT
Start: 2017-01-01 | End: 2017-01-01

## 2017-01-01 RX ORDER — MORPHINE SULFATE 2 MG/ML
2 INJECTION, SOLUTION INTRAMUSCULAR; INTRAVENOUS EVERY 30 MIN PRN
Status: DISCONTINUED | OUTPATIENT
Start: 2017-01-01 | End: 2017-01-01

## 2017-01-01 RX ORDER — ACETAMINOPHEN 325 MG/1
650 TABLET ORAL EVERY 6 HOURS PRN
Status: DISCONTINUED | OUTPATIENT
Start: 2017-01-01 | End: 2017-01-01

## 2017-01-01 RX ORDER — DEXTROSE AND SODIUM CHLORIDE 5; .9 G/100ML; G/100ML
INJECTION, SOLUTION INTRAVENOUS
Status: COMPLETED
Start: 2017-01-01 | End: 2017-01-01

## 2017-01-01 RX ORDER — HYDROCODONE BITARTRATE AND ACETAMINOPHEN 5; 325 MG/1; MG/1
2 TABLET ORAL AS NEEDED
Status: DISCONTINUED | OUTPATIENT
Start: 2017-01-01 | End: 2017-01-01

## 2017-01-01 RX ORDER — ONDANSETRON 2 MG/ML
4 INJECTION INTRAMUSCULAR; INTRAVENOUS ONCE AS NEEDED
Status: ACTIVE | OUTPATIENT
Start: 2017-01-01 | End: 2017-01-01

## 2017-01-01 RX ORDER — SODIUM CHLORIDE 9 MG/ML
INJECTION, SOLUTION INTRAVENOUS CONTINUOUS
Status: DISCONTINUED | OUTPATIENT
Start: 2017-01-01 | End: 2017-01-01

## 2017-01-01 RX ORDER — NALOXONE HYDROCHLORIDE 0.4 MG/ML
80 INJECTION, SOLUTION INTRAMUSCULAR; INTRAVENOUS; SUBCUTANEOUS AS NEEDED
Status: DISCONTINUED | OUTPATIENT
Start: 2017-01-01 | End: 2017-01-01 | Stop reason: HOSPADM

## 2017-01-01 RX ORDER — NALOXONE HYDROCHLORIDE 0.4 MG/ML
80 INJECTION, SOLUTION INTRAMUSCULAR; INTRAVENOUS; SUBCUTANEOUS AS NEEDED
Status: ACTIVE | OUTPATIENT
Start: 2017-01-01 | End: 2017-01-01

## 2017-01-01 RX ORDER — PEN NEEDLE, DIABETIC 30 GX3/16"
1 NEEDLE, DISPOSABLE MISCELLANEOUS DAILY
Qty: 100 EACH | Refills: 0 | Status: SHIPPED | OUTPATIENT
Start: 2017-01-01 | End: 2017-01-01

## 2017-01-01 RX ORDER — LISINOPRIL 5 MG/1
5 TABLET ORAL
Status: DISCONTINUED | OUTPATIENT
Start: 2017-01-01 | End: 2017-01-01

## 2017-01-01 RX ORDER — HYDROMORPHONE HYDROCHLORIDE 1 MG/ML
0.4 INJECTION, SOLUTION INTRAMUSCULAR; INTRAVENOUS; SUBCUTANEOUS EVERY 5 MIN PRN
Status: DISCONTINUED | OUTPATIENT
Start: 2017-01-01 | End: 2017-01-01 | Stop reason: HOSPADM

## 2017-01-01 RX ORDER — METHIMAZOLE 10 MG/1
5 TABLET ORAL DAILY
Status: DISCONTINUED | OUTPATIENT
Start: 2017-01-01 | End: 2017-01-01

## 2017-01-01 RX ORDER — HYDROCODONE BITARTRATE AND ACETAMINOPHEN 5; 325 MG/1; MG/1
1-2 TABLET ORAL EVERY 6 HOURS PRN
Status: DISCONTINUED | OUTPATIENT
Start: 2017-01-01 | End: 2017-01-01

## 2017-01-01 RX ORDER — INSULIN GLARGINE 100 [IU]/ML
INJECTION, SOLUTION SUBCUTANEOUS
Qty: 30 ML | Refills: 1 | Status: SHIPPED | OUTPATIENT
Start: 2017-01-01 | End: 2017-01-01

## 2017-01-01 RX ORDER — PSEUDOEPHEDRINE HCL 30 MG
100 TABLET ORAL 2 TIMES DAILY
Qty: 1 CAPSULE | Refills: 0 | Status: ON HOLD | OUTPATIENT
Start: 2017-01-01 | End: 2017-01-01

## 2017-01-01 RX ORDER — LIDOCAINE HYDROCHLORIDE 10 MG/ML
0.5 INJECTION, SOLUTION INFILTRATION; PERINEURAL ONCE AS NEEDED
Status: ACTIVE | OUTPATIENT
Start: 2017-01-01 | End: 2017-01-01

## 2017-01-01 RX ORDER — HYDROMORPHONE HYDROCHLORIDE 1 MG/ML
0.2 INJECTION, SOLUTION INTRAMUSCULAR; INTRAVENOUS; SUBCUTANEOUS EVERY 5 MIN PRN
Status: DISCONTINUED | OUTPATIENT
Start: 2017-01-01 | End: 2017-01-01

## 2017-01-01 RX ORDER — DOCUSATE SODIUM 100 MG/1
100 CAPSULE, LIQUID FILLED ORAL 2 TIMES DAILY
Status: DISCONTINUED | OUTPATIENT
Start: 2017-01-01 | End: 2017-01-01

## 2017-01-01 RX ORDER — INSULIN LISPRO 100 [IU]/ML
INJECTION, SOLUTION INTRAVENOUS; SUBCUTANEOUS
Refills: 0 | COMMUNITY
Start: 2017-01-01 | End: 2018-01-01

## 2017-01-01 RX ORDER — DEXTROSE MONOHYDRATE 25 G/50ML
50 INJECTION, SOLUTION INTRAVENOUS AS NEEDED
Status: DISCONTINUED | OUTPATIENT
Start: 2017-01-01 | End: 2017-01-01

## 2017-01-01 RX ORDER — HYDROMORPHONE HYDROCHLORIDE 1 MG/ML
0.6 INJECTION, SOLUTION INTRAMUSCULAR; INTRAVENOUS; SUBCUTANEOUS EVERY 5 MIN PRN
Status: DISCONTINUED | OUTPATIENT
Start: 2017-01-01 | End: 2017-01-01 | Stop reason: HOSPADM

## 2017-01-01 RX ORDER — SODIUM PHOSPHATE, DIBASIC AND SODIUM PHOSPHATE, MONOBASIC 7; 19 G/133ML; G/133ML
1 ENEMA RECTAL ONCE AS NEEDED
Status: DISCONTINUED | OUTPATIENT
Start: 2017-01-01 | End: 2017-01-01

## 2017-01-01 RX ORDER — ONDANSETRON 2 MG/ML
4 INJECTION INTRAMUSCULAR; INTRAVENOUS EVERY 6 HOURS PRN
Status: DISCONTINUED | OUTPATIENT
Start: 2017-01-01 | End: 2017-01-01

## 2017-01-01 RX ORDER — BISACODYL 10 MG
10 SUPPOSITORY, RECTAL RECTAL
Qty: 1 SUPPOSITORY | Refills: 0 | Status: ON HOLD | OUTPATIENT
Start: 2017-01-01 | End: 2017-01-01

## 2017-01-01 RX ORDER — BLOOD-GLUCOSE METER
4 KIT MISCELLANEOUS DAILY
Qty: 1 KIT | Refills: 0 | Status: SHIPPED | OUTPATIENT
Start: 2017-01-01 | End: 2017-01-01

## 2017-01-01 RX ORDER — SODIUM CHLORIDE 0.9 % (FLUSH) 0.9 %
3 SYRINGE (ML) INJECTION AS NEEDED
Status: DISCONTINUED | OUTPATIENT
Start: 2017-01-01 | End: 2017-01-01

## 2017-01-01 RX ORDER — BLOOD SUGAR DIAGNOSTIC
STRIP MISCELLANEOUS
Qty: 400 EACH | Refills: 0 | Status: SHIPPED | OUTPATIENT
Start: 2017-01-01

## 2017-01-01 RX ORDER — FAMOTIDINE 10 MG/ML
20 INJECTION, SOLUTION INTRAVENOUS 2 TIMES DAILY
Status: DISCONTINUED | OUTPATIENT
Start: 2017-01-01 | End: 2017-01-01

## 2017-01-01 RX ORDER — NALBUPHINE HCL 10 MG/ML
2.5 AMPUL (ML) INJECTION EVERY 4 HOURS PRN
Status: DISCONTINUED | OUTPATIENT
Start: 2017-01-01 | End: 2017-01-01

## 2017-01-01 RX ORDER — INSULIN GLARGINE 100 [IU]/ML
INJECTION, SOLUTION SUBCUTANEOUS
Qty: 10 ML | Refills: 5 | Status: ON HOLD | OUTPATIENT
Start: 2017-01-01 | End: 2017-01-01

## 2017-01-01 RX ORDER — DEXTROSE MONOHYDRATE 50 MG/ML
INJECTION, SOLUTION INTRAVENOUS CONTINUOUS
Status: DISCONTINUED | OUTPATIENT
Start: 2017-01-01 | End: 2017-01-01

## 2017-01-01 RX ORDER — SODIUM CHLORIDE 0.9 % (FLUSH) 0.9 %
10 SYRINGE (ML) INJECTION AS NEEDED
Status: DISCONTINUED | OUTPATIENT
Start: 2017-01-01 | End: 2017-01-01 | Stop reason: HOSPADM

## 2017-01-01 RX ORDER — INSULIN LISPRO 100 [IU]/ML
INJECTION, SOLUTION INTRAVENOUS; SUBCUTANEOUS
Qty: 50 ML | Refills: 0 | Status: ON HOLD | OUTPATIENT
Start: 2017-01-01 | End: 2017-01-01

## 2017-01-01 RX ORDER — ONDANSETRON 2 MG/ML
INJECTION INTRAMUSCULAR; INTRAVENOUS AS NEEDED
Status: DISCONTINUED | OUTPATIENT
Start: 2017-01-01 | End: 2017-01-01 | Stop reason: SURG

## 2017-01-01 RX ORDER — SODIUM CHLORIDE, SODIUM LACTATE, POTASSIUM CHLORIDE, CALCIUM CHLORIDE 600; 310; 30; 20 MG/100ML; MG/100ML; MG/100ML; MG/100ML
INJECTION, SOLUTION INTRAVENOUS CONTINUOUS
Status: DISCONTINUED | OUTPATIENT
Start: 2017-01-01 | End: 2017-01-01 | Stop reason: HOSPADM

## 2017-01-01 RX ORDER — DEXTROSE AND SODIUM CHLORIDE 5; .45 G/100ML; G/100ML
INJECTION, SOLUTION INTRAVENOUS
Status: COMPLETED
Start: 2017-01-01 | End: 2017-01-01

## 2017-01-01 RX ORDER — ENOXAPARIN SODIUM 100 MG/ML
40 INJECTION SUBCUTANEOUS DAILY
Qty: 1 VIAL | Refills: 0 | Status: ON HOLD | OUTPATIENT
Start: 2017-01-01 | End: 2017-01-01

## 2017-01-01 RX ORDER — PANTOPRAZOLE SODIUM 40 MG/1
40 TABLET, DELAYED RELEASE ORAL
Status: DISCONTINUED | OUTPATIENT
Start: 2017-01-01 | End: 2017-01-01

## 2017-01-01 RX ORDER — MORPHINE SULFATE 2 MG/ML
2 INJECTION, SOLUTION INTRAMUSCULAR; INTRAVENOUS EVERY 10 MIN PRN
Status: DISCONTINUED | OUTPATIENT
Start: 2017-01-01 | End: 2017-01-01 | Stop reason: HOSPADM

## 2017-01-01 RX ORDER — MORPHINE SULFATE 2 MG/ML
2 INJECTION, SOLUTION INTRAMUSCULAR; INTRAVENOUS EVERY 10 MIN PRN
Status: DISCONTINUED | OUTPATIENT
Start: 2017-01-01 | End: 2017-01-01

## 2017-01-01 RX ORDER — METHIMAZOLE 5 MG/1
5 TABLET ORAL DAILY
Qty: 30 TABLET | Refills: 6 | Status: SHIPPED | OUTPATIENT
Start: 2017-01-01

## 2017-01-01 RX ORDER — 0.9 % SODIUM CHLORIDE 0.9 %
VIAL (ML) INJECTION
Status: COMPLETED
Start: 2017-01-01 | End: 2017-01-01

## 2017-01-01 RX ORDER — MORPHINE SULFATE 4 MG/ML
4 INJECTION, SOLUTION INTRAMUSCULAR; INTRAVENOUS EVERY 10 MIN PRN
Status: DISCONTINUED | OUTPATIENT
Start: 2017-01-01 | End: 2017-01-01

## 2017-01-01 RX ORDER — BLOOD-GLUCOSE METER
1 KIT MISCELLANEOUS DAILY
Qty: 1 KIT | Refills: 0 | Status: SHIPPED | OUTPATIENT
Start: 2017-01-01

## 2017-01-01 RX ORDER — SODIUM CHLORIDE 0.9 % (FLUSH) 0.9 %
10 SYRINGE (ML) INJECTION AS NEEDED
Status: DISCONTINUED | OUTPATIENT
Start: 2017-01-01 | End: 2017-01-01

## 2017-01-01 RX ORDER — ONDANSETRON 2 MG/ML
4 INJECTION INTRAMUSCULAR; INTRAVENOUS ONCE AS NEEDED
Status: DISCONTINUED | OUTPATIENT
Start: 2017-01-01 | End: 2017-01-01 | Stop reason: HOSPADM

## 2017-01-01 RX ORDER — HEPARIN SODIUM 5000 [USP'U]/ML
5000 INJECTION, SOLUTION INTRAVENOUS; SUBCUTANEOUS EVERY 12 HOURS
Status: DISCONTINUED | OUTPATIENT
Start: 2017-01-01 | End: 2017-01-01

## 2017-01-01 RX ORDER — MORPHINE SULFATE 4 MG/ML
4 INJECTION, SOLUTION INTRAMUSCULAR; INTRAVENOUS EVERY 10 MIN PRN
Status: DISCONTINUED | OUTPATIENT
Start: 2017-01-01 | End: 2017-01-01 | Stop reason: HOSPADM

## 2017-01-01 RX ORDER — HALOPERIDOL 5 MG/ML
0.25 INJECTION INTRAMUSCULAR ONCE AS NEEDED
Status: DISCONTINUED | OUTPATIENT
Start: 2017-01-01 | End: 2017-01-01 | Stop reason: HOSPADM

## 2017-01-01 RX ORDER — HYDROCODONE BITARTRATE AND ACETAMINOPHEN 5; 325 MG/1; MG/1
1 TABLET ORAL EVERY 4 HOURS PRN
Status: DISCONTINUED | OUTPATIENT
Start: 2017-01-01 | End: 2017-01-01

## 2017-01-01 RX ORDER — POLYETHYLENE GLYCOL 3350 17 G/17G
17 POWDER, FOR SOLUTION ORAL DAILY PRN
Qty: 1 G | Refills: 0 | Status: ON HOLD | OUTPATIENT
Start: 2017-01-01 | End: 2017-01-01

## 2017-01-01 RX ORDER — HEPARIN SODIUM 5000 [USP'U]/ML
5000 INJECTION, SOLUTION INTRAVENOUS; SUBCUTANEOUS EVERY 12 HOURS SCHEDULED
Status: DISCONTINUED | OUTPATIENT
Start: 2017-01-01 | End: 2017-01-01

## 2017-01-01 RX ADMIN — ONDANSETRON 4 MG: 2 INJECTION INTRAMUSCULAR; INTRAVENOUS at 19:34:00

## 2017-01-01 RX ADMIN — MIDAZOLAM HYDROCHLORIDE 2 MG: 1 INJECTION INTRAMUSCULAR; INTRAVENOUS at 09:21:00

## 2017-01-01 RX ADMIN — SODIUM CHLORIDE, SODIUM LACTATE, POTASSIUM CHLORIDE, CALCIUM CHLORIDE: 600; 310; 30; 20 INJECTION, SOLUTION INTRAVENOUS at 09:01:00

## 2017-01-01 RX ADMIN — LIDOCAINE HYDROCHLORIDE 50 MG: 10 INJECTION, SOLUTION EPIDURAL; INFILTRATION; INTRACAUDAL; PERINEURAL at 19:15:00

## 2017-01-01 RX ADMIN — ONDANSETRON 4 MG: 2 INJECTION INTRAMUSCULAR; INTRAVENOUS at 09:31:00

## 2017-01-01 RX ADMIN — SODIUM CHLORIDE, SODIUM LACTATE, POTASSIUM CHLORIDE, CALCIUM CHLORIDE: 600; 310; 30; 20 INJECTION, SOLUTION INTRAVENOUS at 10:56:00

## 2017-01-01 RX ADMIN — ONDANSETRON 4 MG: 2 INJECTION INTRAMUSCULAR; INTRAVENOUS at 19:35:00

## 2017-01-01 RX ADMIN — MIDAZOLAM HYDROCHLORIDE 2 MG: 1 INJECTION INTRAMUSCULAR; INTRAVENOUS at 19:15:00

## 2017-01-01 RX ADMIN — SODIUM CHLORIDE, SODIUM LACTATE, POTASSIUM CHLORIDE, CALCIUM CHLORIDE: 600; 310; 30; 20 INJECTION, SOLUTION INTRAVENOUS at 20:32:00

## 2017-01-01 RX ADMIN — SODIUM CHLORIDE, SODIUM LACTATE, POTASSIUM CHLORIDE, CALCIUM CHLORIDE: 600; 310; 30; 20 INJECTION, SOLUTION INTRAVENOUS at 10:29:00

## 2017-01-01 RX ADMIN — SODIUM CHLORIDE, SODIUM LACTATE, POTASSIUM CHLORIDE, CALCIUM CHLORIDE: 600; 310; 30; 20 INJECTION, SOLUTION INTRAVENOUS at 19:40:00

## 2017-02-02 RX ORDER — SYRINGE AND NEEDLE,INSULIN,1ML 31 GX5/16"
SYRINGE, EMPTY DISPOSABLE MISCELLANEOUS
Qty: 60 EACH | Refills: 5 | Status: SHIPPED | OUTPATIENT
Start: 2017-02-02 | End: 2017-01-01

## 2017-02-02 RX ORDER — INSULIN GLARGINE 100 [IU]/ML
INJECTION, SOLUTION SUBCUTANEOUS
Qty: 10 ML | Refills: 5 | Status: SHIPPED | OUTPATIENT
Start: 2017-02-02 | End: 2017-01-01

## 2017-02-02 NOTE — TELEPHONE ENCOUNTER
Last seen 1-19-16   Last refill 1-21-16  Rx sent to [facility] for sign off.

## 2017-06-14 NOTE — TELEPHONE ENCOUNTER
Pts mother calling to f/up on getting refill for the pts insulin. She states that the pt. Ran out last night. I let her know that [facility] is out of the office this week. Mom is requesting to speak to RN about getting Rx filled right away.

## 2017-06-14 NOTE — TELEPHONE ENCOUNTER
Brittany checking status on message. States pt is completely out of medication. Pls call - aware [facility] is out of the office. Thank you.

## 2017-06-15 NOTE — TELEPHONE ENCOUNTER
Last seen 1-19-16  Last refill 2-2-17 rx routed to Dr. Bandar Ledbetter for sign off. Dr Magi Hoskins out of office.

## 2017-06-15 NOTE — TELEPHONE ENCOUNTER
Spoke to [employer]. Raine Mcfarland sttd that pt is completely out of Lantus. Raine Mcfarland notified that the on call physician will be paged. Dr. Michel Ortiz paged and signed off on Lantus. Raine Mcfarland notified.   9:30 am

## 2017-06-27 NOTE — TELEPHONE ENCOUNTER
Brittany called to inform [address] [place] [address] that pt has a huge sore on right foot and right foot is very swollen.  Please Call Thank You

## 2017-06-27 NOTE — TELEPHONE ENCOUNTER
Appt made for 6-29-17 9:15 am. Pt notified of time date and place of appt. Pt verbalized understanding. Pt notified of orders below and given dermatology phone # [phone]. Pt verbalized understanding.   Message left informing Yahaira WELCH Supervisor to

## 2017-06-27 NOTE — TELEPHONE ENCOUNTER
RN, please contact the patient and see me this Thursday and start Dee. he also should make an appointment to see dermatology.

## 2017-06-27 NOTE — TELEPHONE ENCOUNTER
Pt c/o fluid filled blister 3/4 inch by 1/2 inch on the outside of right foot midway between toes and heal for a couple months. Pt states blister leaks clear to red fluid, is red, painful at times 6/10 when walking and sometimes pain just comes and goes.

## 2017-06-29 PROBLEM — L97.509 DIABETIC FOOT ULCER (HCC): Status: ACTIVE | Noted: 2017-01-01

## 2017-06-29 PROBLEM — E11.621 DIABETIC FOOT ULCER (HCC): Status: ACTIVE | Noted: 2017-01-01

## 2017-06-29 NOTE — PROGRESS NOTES
The patient is a 53-year-old male who I know well from prior evaluation comes in now for follow-up. The patient comes in with a weeping wound at the swollen right foot.     Review of systems: Vision normal. Ear nose and throat normal. Bowel normal. Bladder

## 2017-06-29 NOTE — CONSULTS
[facility]    Report of Consultation    Osmel Baugh Patient Status:  Inpatient    1955 MRN [id number]   Location [facility] 1SW Attending Nathalia Root MD   Hosp Day # 0 PCP Audra Neil MD     Date of Admission: 500 MG Oral Tab Take 1 tablet (500 mg total) by mouth daily.    LANTUS 100 UNIT/ML Subcutaneous Solution INJECT 75 UNITS SUBCUTANEOUSLY AS DIRECTED PER INSULIN PROTOCOL   ULTICARE INSULIN SYRINGE 31G X 5/16\" 1 ML Does not apply Misc USE AS DIRECTED DAILY patient.     Candice Sandra  6/29/2017

## 2017-06-29 NOTE — CONSULTS
[facility] - [facility]    Report of Consultation    Da Bailon Patient Status:  Inpatient    1955 MRN [id number]   Location [facility] 1SW Attending Roxy Castro MD   Hosp Day # 0 PCP Cecilia Davis MD     Date of Admission 1-11 Units, 1-11 Units, Subcutaneous, TID CC  •  meropenem (MERREM) IVPB 500 mg/100 ml in 0.9% NaCl minibag, 500 mg, Intravenous, Q8H    Physical Exam:  Blood pressure 140/60, pulse 97, temperature 98.1 °F (36.7 °C), temperature source Oral, resp.  rate 18,

## 2017-06-29 NOTE — H&P
[facility]    History & Physical    Date:  6/29/2017   Date of Admission:  6/29/2017      Chief Complaint:   Nurys Miller is a(n) 63year old male with malodorous foot wound. HPI:   The patient is a history of diabetes mellitus.   I met SUBCUTANEOUSLY AS DIRECTED PER INSULIN PROTOCOL   ULTICARE INSULIN SYRINGE 31G X 5/16\" 1 ML Does not apply Misc USE AS DIRECTED DAILY   LISINOPRIL 5 MG Oral Tab TAKE 1 TABLET BY MOUTH EVERY DAY   Insulin Lispro, Human, (HUMALOG) 100 UNIT/ML Subcutaneous S 06/29/2017   TSH 20.91 06/29/2017       Assessment/Plan:   1. Diabetic foot ulcer–presumably with osteomyelitis. Recommendations:  1. Diabetic control  2. Meropenem as per ID  3. Podiatry or orthopedics evaluation  4. MRI of foot  5.   Cultures of w

## 2017-06-30 NOTE — PROGRESS NOTES
INFECTIOUS DISEASE PROGRESS NOTE    Jolie Stafford Patient Status:  Inpatient    1955 MRN [id number]   Location [facility] 1SW Attending Kiah Huerta MD   Hosp Day # 1 PCP Dario Schmidt MD     Subjective:  Patient seen and examined,

## 2017-06-30 NOTE — PAYOR COMM NOTE
Marti Riojas #[id number]  (64 year old M)     Clarissa Coyle MD Physician Signed   H&P Date of Service: 6/29/2017  6:11 PM      Expand All Collapse All    []Manual[]Template  []Copied  [address] [facility] Allergies/Medications: Allergies:   Penicillins             Unknown    Prescriptions Prior to Admission      Prescriptions Prior to Admission:  levofloxacin 500 MG Oral Tab Take 1 tablet (500 mg total) by mouth daily.    LANTUS 100 UNIT/ML Subcutaneous So CREATSERUM 1.33 06/29/2017   BUN 21 06/29/2017    06/29/2017   K 3.6 06/29/2017   CL 95 06/29/2017   CO2 25 06/29/2017    06/29/2017   CA 8.2 06/29/2017   ALB 2.0 06/29/2017   ALKPHO 176 06/29/2017   BILT 1.0 06/29/2017   TP 6.1 06/29/2017   A

## 2017-06-30 NOTE — PLAN OF CARE
Patient had hypoglycemic episode this morning, blood sugar check was 47 at 0849, 4 oz orange juice given, recheck 15 minutes later was 55, amp of d50 given at 0909, recheck at 0925 was 188. Burgess Sivakumar Mo notified at 8466.

## 2017-06-30 NOTE — PROGRESS NOTES
[facility]    Progress Note    Delorise Baize Patient Status:  Inpatient    1955 MRN [id number]   Location [facility] 1SW Attending Jim Eastman MD   Hosp Day # 1 PCP Richie Barragan MD     Subjective:  Feels well.

## 2017-06-30 NOTE — PROGRESS NOTES
[facility]     Progress Note        Rashida Otero Patient Status:  Inpatient    1955 MRN [id number]   Location [facility] 1SW Attending Em Elias MD   Hosp Day # 1 PCP Alfred Sanford MD       Subjective:   Patient clubbing, cyanosis, edema  Neurologic: Erythema appreciated around right foot, foot wrapped.   Skin: warm, dry      Results:     Lab Results  Component Value Date   WBC 22.0 06/30/2017   HGB 9.0 06/30/2017   HCT 26.6 06/30/2017    06/30/2017   CREATS [address]

## 2017-07-01 NOTE — PROGRESS NOTES
INFECTIOUS DISEASE PROGRESS NOTE    Cristopher Hurt Patient Status:  Inpatient    1955 MRN [id number]   Location [facility] 1SW Attending Raf Gaytan MD   Hosp Day # 2 PCP Anny Hinojosa MD     Subjective:  Patient seen and examined, for I&D, possible bone resection  - glucose control  - follow temp, wbc to doc decr-temps down.  No CBC sent this am so I ordered to follow  - d/w patient        Nesha Moody MD

## 2017-07-01 NOTE — CONSULTS
[facility]  Report of Consultation    Audra Fields Patient Status:  Inpatient    1955 MRN [id number]   Location [facility] 5SW/SE Attending Loren Montiel MD   Hosp Day # 2 PCP Ayana Quiñones MD     Date of Admission:  20 Glucose-Vitamin C (DEX-4) 4-0.006 g chewable tab 4 tablet, 4 tablet, Oral, Q15 Min PRN  •  insulin aspart (NOVOLOG) 100 UNIT/ML flexpen 1-11 Units, 1-11 Units, Subcutaneous, TID CC  •  meropenem (MERREM) IVPB 500 mg/100 ml in 0.9% NaCl minibag, 500 mg, Int located along the fifth metatarsal base but also extending underneath the fascial area of the heel this may actually be superior to the fascia the MRI was only done of the right forefoot and does not encompass that area.   There is also gas noted in the med

## 2017-07-02 NOTE — PROGRESS NOTES
[facility]    Progress Note    Janna Daily Patient Status:  Inpatient    1955 MRN [id number]   Location [facility] 1SW Attending Francheska Garcia MD   Hosp Day # 3 PCP Ming Page MD     Subjective:  Feels well.

## 2017-07-02 NOTE — PLAN OF CARE
Problem: Diabetes/Glucose Control  Goal: Glucose maintained within prescribed range  INTERVENTIONS:  - Monitor Blood Glucose as ordered  - Assess for signs and symptoms of hyperglycemia and hypoglycemia  - Administer ordered medications to maintain glucose integrity  - Assess and document dressing/incision, wound bed, drain sites and surrounding tissue  - Implement wound care per orders     Outcome: Progressing  Surgical drsg claen, dry & intact.  Elevated on pillows no pain reproted    Problem: PAIN - ADULT

## 2017-07-02 NOTE — ANESTHESIA PREPROCEDURE EVALUATION
Anesthesia PreOp Note    HPI:     Barbie Kelley is a 63year old male who presents for preoperative consultation requested by: Ras Madrigal DPM    Date of Surgery: 6/29/2017 - 7/1/2017    Procedure(s):  EXTREMITY LOWER INCISION & DRAINAGE  Indicati Intravenous Continuous Chanelle Cullen MD     HYDROcodone-acetaminophen [facility]) 5-325 MG per tab 1 tablet 1 tablet Oral PRN Chanelle Cullen MD     Or         HYDROcodone-acetaminophen (NORCO) 5-325 MG per tab 2 tablet 2 tablet Oral PRN Geena Del Cid Susan Ontiveros MD 32 Units at 06/30/17 2311    [MAR Hold] insulin aspart (NOVOLOG) 100 UNIT/ML flexpen 12 Units 12 Units Subcutaneous TID CC Damon Patel MD 12 Units at 07/01/17 1347    lisinopril (PRINIVIL,ZESTRIL) tab 5 mg 5 mg Oral Daily Cedric Tejeda, 07/01/2017   RBC 2.97 (L) 07/01/2017   HGB 8.7 (L) 07/01/2017   HCT 26.6 (L) 07/01/2017   MCV 89.4 07/01/2017   MCH 29.2 07/01/2017   MCHC 32.7 07/01/2017   RDW 14.2 07/01/2017    07/01/2017   MPV 7.9 07/01/2017       Lab Results  Component Value Da

## 2017-07-02 NOTE — PROGRESS NOTES
07/02/17  0438   BP: 98/42   Pulse: 80   Resp: 18   Temp: 97.8 °F (36.6 °C)   Subjective patient is resting comfortably in bed he said he feels much better today.   He has not had any complaints of fever chills has no complaints of pain that is more consis

## 2017-07-02 NOTE — ANESTHESIA POSTPROCEDURE EVALUATION
Patient: Cristopher Limber    Procedure Summary     Date:  07/01/17 Room / Location:  [address] [place] [address] MAIN OR 04 / [address] [place] [address] OR    Anesthesia Start:  1914 Anesthesia Stop:      Procedure:  EXTREMITY LOWER INCISION & DRAINAGE (Right Foot) Diagnosis:       Diabetic ulcer of

## 2017-07-02 NOTE — PLAN OF CARE
Problem: Diabetes/Glucose Control  Goal: Glucose maintained within prescribed range  INTERVENTIONS:  - Monitor Blood Glucose as ordered  - Assess for signs and symptoms of hyperglycemia and hypoglycemia  - Administer ordered medications to maintain glucose would you like us to know as we care for you?  - Provide timely, complete, and accurate information to patient/family  - Incorporate patient and family knowledge, values, beliefs, and cultural backgrounds into the planning and delivery of care  - Encourage preferences of patient/family/discharge partner  - Complete POLST form as appropriate  - Assess patient's ability to be responsible for managing their own health  - Refer to Case Management Department for coordinating discharge planning if the patient need

## 2017-07-02 NOTE — PROGRESS NOTES
Pulmonary/Critical Care Follow Up Note    HPI:   Paul Hendrix is a 63year old male with No chief complaint on file.       PCP Frederick Benavidez MD  Admission Attending Shola Schwartz 15 Day #2    Non  verbal    PMH:  Past Medical History: Units, Subcutaneous, Nightly  •  [MAR Hold] insulin aspart (NOVOLOG) 100 UNIT/ML flexpen 12 Units, 12 Units, Subcutaneous, TID CC  •  lisinopril (PRINIVIL,ZESTRIL) tab 5 mg, 5 mg, Oral, Daily  •  [MAR Hold] methimazole (TAPAZOLE) tab 5 mg, 5 mg, Oral, TID

## 2017-07-02 NOTE — PROGRESS NOTES
Pt's blood sugar 232, Dr. Dhruv Prasad called re; blood sugar, orders given to increase meal insulin to 20 units with meal & follow sliding scale but not to exceed 25 units at this time for blood sugar coverage(20 units total w/ sliding scale meal coverage)

## 2017-07-02 NOTE — BRIEF OP NOTE
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

## 2017-07-02 NOTE — PROGRESS NOTES
[facility]    Progress Note    Janusz Solis Patient Status:  Inpatient    1955 MRN [id number]   Location [facility] 1SW Attending Melony Patterson MD   Hosp Day # 3 PCP Devon Molina MD     Subjective:  Feels well.

## 2017-07-02 NOTE — PROGRESS NOTES
ORTHO SURG: PATIENT SEEN AT DR. Wilder Arcos REQUEST. CONSULT NOTE DICTATED. CURRENTLY NO INDICATION FOR W/U OR RX INVOLVING THE RIGHT LEG. CONTINUED CARE FOR THE THE RIGHT FOOT INFECTION PER DR. Leatha Worthington.

## 2017-07-02 NOTE — CONSULTS
[facility]    PATIENT'S NAME: Yovani Radha   ATTENDING PHYSICIAN: Jordin Gentile MD   CONSULTING PHYSICIAN: Serafin Alfaro.  MD Tj   PATIENT ACCOUNT#:   [de-identified]    LOCATION:  [facility] #:   [id number]       DATE OF BIRTH: 93% neutrophils. Repeat white blood cell count on 06/30/2017, was 22,000 with 85% neutrophils. The third hospital day showed a white blood cell count 22,800 with 90% neutrophils.   Following that lab result, the patient was taken to surgery by Dr. Mccrary Fent awareness of the severity of the right foot infection and the potential threat to the right lower extremity, so as to require ablative treatment with below-knee amputation. Thank you for referring this gentleman for orthopedic surgical evaluation.

## 2017-07-02 NOTE — OPERATIVE REPORT
[facility]    PATIENT'S NAME: Werner Momo   ATTENDING PHYSICIAN: Richie Barragan MD   OPERATING PHYSICIAN: Mimi Wall.  TRUDI Joyner   PATIENT ACCOUNT#:   [de-identified]    LOCATION:  [address] [place] [address] #:   [id number]       DATE OF BIR follows:      1. Necrotic fascia and soft tissue to Microbiology for aerobic and anaerobic culture and sensitivity. 2.   Necrotic soft tissue in fashion to Pathology. 3.   Bone from the fifth metatarsal base for osteomyelitis.       PROCEDURE:  The dewayne directly to the other plantar incision laterally. There was necrotic infected tissue there.   There was purulent drainage noted on the medial side of the foot, some subcutaneously and some again between the fascia and the first layer of muscle bellies in t

## 2017-07-03 NOTE — WOUND PROGRESS NOTE
WOUND CARE NOTE      PLAN   Recommendations:  Dr. Jessee Kaur and ID currently on consult  Dietary consult for recommendations for nutrition to optimize wound healing  Diabetic Education for optimal glucose control  VAC standing orders  VAC troubleshooting i (H) 07/03/2017   HGB 9.3 (L) 07/03/2017   HCT 28.3 (L) 07/03/2017    (H) 07/03/2017   CREATSERUM 0.84 07/03/2017   BUN 15 07/03/2017    07/03/2017   K 4.1 07/03/2017    07/03/2017   CO2 28 07/03/2017    (H) 07/03/2017   CA 7.8 (L)

## 2017-07-03 NOTE — DISCHARGE PLANNING
3:32pm Update- DENZEL informed that the podiatrist intends to add a wound vac. Pt is self pay, so he would not be eligible for home health unless through a pro-ezekiel visit, or self pay rate.  DENZEL initiated call to [facility], but this request will need

## 2017-07-03 NOTE — PROGRESS NOTES
[facility]    Progress Note    Paul Hendrix Patient Status:  Inpatient    1955 MRN [id number]   Location [facility] 1SW Attending Elaina Narvaez MD   Hosp Day # 4 PCP Frederick Benavidez MD     Subjective:  Feels well.

## 2017-07-03 NOTE — PROGRESS NOTES
[facility]    Progress Note      Assessment and Plan:   1. Diabetic foot ulcer– with possible osteomyelitis status post debridement. Staph aureus growing on the culture but there also were multiple other bacteria identified on the smears. 07/03/2017    07/03/2017   K 4.1 07/03/2017    07/03/2017   CO2 28 07/03/2017    07/03/2017   CA 7.8 07/03/2017       Rita Castaneda MD  Medical Director, Critical Care, [address] [place] [address]  Medical Director, [facility]  Pager: [phone]

## 2017-07-03 NOTE — PROGRESS NOTES
Patient blood sugar for dinner was 58. 4 oz oj was given. Dr. Cedric Montgomery aware. At 1815 blood sugar was 77. Meal was given. Per md insulin decreased to 20 units of novolog.  Wants patient to receive it with dinner and will reassess hs sugar and call MD with r

## 2017-07-03 NOTE — PROGRESS NOTES
07/03/17  1443   BP: 114/53   Pulse: 79   Resp: 18   Temp: 97.7 °F (36.5 °C)    patient was seen resting comfortably in bed he is feeling better wound care nurses present. Exam: Patient is now postop day #2 the patient is stable he is afebrile.   Tissue

## 2017-07-03 NOTE — PLAN OF CARE
Problem: Diabetes/Glucose Control  Goal: Glucose maintained within prescribed range  INTERVENTIONS:  - Monitor Blood Glucose as ordered  - Assess for signs and symptoms of hyperglycemia and hypoglycemia  - Administer ordered medications to maintain glucose knowledge, values, beliefs, and cultural backgrounds into the planning and delivery of care  - Encourage patient/family to participate in care and decision-making at the level they choose  - Honor patient and family perspectives and choices   Outcome: Prog health  - Refer to Case Management Department for coordinating discharge planning if the patient needs post-hospital services based on physician/LIP order or complex needs related to functional status, cognitive ability or social support system   Outcome:

## 2017-07-03 NOTE — PROGRESS NOTES
INFECTIOUS DISEASE PROGRESS NOTE    Ca Lugo Patient Status:  Inpatient    1955 MRN [id number]   Location [facility] 1SW Attending Nabor Cole MD   Hosp Day # 4 PCP Jose Carlos Dahl MD     Subjective:  Patient seen and examined, control  - follow temp, wbc to doc decr  - d/w patient  - will follow      [facility]  [phone]

## 2017-07-03 NOTE — PLAN OF CARE
Problem: Diabetes/Glucose Control  Goal: Glucose maintained within prescribed range  INTERVENTIONS:  - Monitor Blood Glucose as ordered  - Assess for signs and symptoms of hyperglycemia and hypoglycemia  - Administer ordered medications to maintain glucose identified and integrated in the patient's plan of care  Interventions:  - What would you like us to know as we care for you?  - Provide timely, complete, and accurate information to patient/family  - Incorporate patient and family knowledge, values, belie resources and transportation as appropriate  - Identify discharge learning needs (meds, wound care, etc)  - Arrange for interpreters to assist at discharge as needed  - Consider post-discharge preferences of patient/family/discharge partner  - Complete ANMOL

## 2017-07-04 NOTE — PROGRESS NOTES
[facility]    Progress Note      Assessment and Plan:   1. Diabetic foot ulcer– with possible osteomyelitis status post debridement. Staph aureus growing on the culture but there also were multiple other bacteria identified on the smears. reflex. Results:       Wound culture–MSSA, plus multiple organisms identified.     Juan M Mathew MD  Medical Director, Critical Care, [address] [place] [address]  Medical Director, [facility]  Pager: [phone]

## 2017-07-04 NOTE — PROGRESS NOTES
[facility] - [facility]    Progress Note    Logan Hands Patient Status:  Inpatient    1955 MRN [id number]   Location [facility] 1SW Attending Gabriel Salguero MD   Hosp Day # 5 PCP Juan M Mathew MD     Subjective:  Low BG this m

## 2017-07-05 NOTE — DISCHARGE PLANNING
DENZEL received call from [facility] [phone] so the pt can complete the matt application for the wound vac. DENZEL obtained the documents and will provide this to the pt to complete his income information.  DENZEL spoke with Wound RN Cesar Morales and Claudia Benitez as the p

## 2017-07-05 NOTE — CONSULTS
PLASTIC SURGERY - FULL CONSULTATION DICTATED      IMPRESSION:  Extensive right foot wound with necrotic tissue and exposed calcaneus    DISPOSITION / PLAN:  The plantar flap is thin with necrotic fat, as well as eschars and nonviable skin.   Further debride

## 2017-07-05 NOTE — WOUND PROGRESS NOTE
WOUND CARE NOTE      PLAN   Recommendations:  Dr. Flex Springer and ID currently on consult  Dietary consult for recommendations for nutrition to optimize wound healing  Diabetic Education for optimal glucose control  VAC standing orders  VAC troubleshooting i Wound RN Johny Silva is at bedside. R foot wound cleansed, benzoin and skin prep applied to periwound. Mepitel1 applied over sutures. Mepilex no-border foam and alginate applied to periwound.  Wound VAC applied with seal maintained at 100mmHg low continuous sucti

## 2017-07-05 NOTE — CONSULTS
[facility]    PATIENT'S NAME: Hilda Yasemin   ATTENDING PHYSICIAN: Maribell Beach MD   CONSULTING PHYSICIAN: Tl Robertson MD   PATIENT ACCOUNT#:   [id number]    LOCATION:  [address] [place] [address] Road #:   [id number]       DATE OF 22.    IMPRESSION:    1. Extensive right foot wound. 2.   Diabetes. DISPOSITION:  This is an extensive wound with further necrotic tissue that must be debrided.   He is not a candidate for skin graft or skin flap at this point, and I doubt that this w

## 2017-07-05 NOTE — DISCHARGE PLANNING
DENZEL spoke w/ [facility] in regards to referral. Due to pt being self-pay and needing nursing care about 3x/wk due to wound vac, [facility] is unable to accept for pro-ezekiel visits at this time. DENZEL informed RN of this and that pt would need to be set up w/ wound clinic.

## 2017-07-05 NOTE — PLAN OF CARE
Problem: Diabetes/Glucose Control  Goal: Glucose maintained within prescribed range  INTERVENTIONS:  - Monitor Blood Glucose as ordered  - Assess for signs and symptoms of hyperglycemia and hypoglycemia  - Administer ordered medications to maintain glucose understanding of call light and agrees to call for needs/concerns    Problem: Patient Centered Care  Goal: Patient preferences are identified and integrated in the patient's plan of care  Interventions:  - What would you like us to know as we care for you? Progressing  Denies pain    Problem: DISCHARGE PLANNING  Goal: Discharge to home or other facility with appropriate resources  INTERVENTIONS:  - Identify barriers to discharge w/pt and caregiver  - Include patient/family/discharge partner in discharge plan

## 2017-07-05 NOTE — PROGRESS NOTES
INFECTIOUS DISEASE PROGRESS NOTE    Belen Rand Patient Status:  Inpatient    1955 MRN [id number]   Location [facility] 1SW Attending Ramos Paez MD   Hosp Day # 6 PCP Shira Maher MD     Subjective:  Patient seen and examined, patient  - will follow      [facility]  [phone]

## 2017-07-05 NOTE — PROGRESS NOTES
[facility]    Progress Note      Assessment and Plan:   1. Diabetic foot ulcer– status post debridement and antibiotic. Patient was evaluated by plastic surgery. They do not feel that the foot is salvageable.     Recommendations:  1.   Leopold Martens strength and reflex.      Results:       Lab Results  Component Value Date   WBC 12.5 07/05/2017   HGB 9.5 07/05/2017   HCT 28.4 07/05/2017    07/05/2017   CREATSERUM 0.87 07/05/2017   BUN 17 07/05/2017    07/05/2017   K 3.6 07/05/2017

## 2017-07-05 NOTE — PROGRESS NOTES
[facility]    Progress Note    Ana Delgado Patient Status:  Inpatient    1955 MRN [id number]   Location [facility] 1SW Attending Georganne Meckel, MD   Hosp Day # 6 PCP Lenore Valera MD     Subjective:  Hypoglycemic

## 2017-07-05 NOTE — PROGRESS NOTES
ORTHO SURG:   I WAS ASKED BY DR. Ruth Ann Razo TO SEE THIS PATIENT IN F/U CONSULT. AM LABS: WBC = 12,500,  H/H = 9.5 / 28.4, PLATELETS = 792,357. PE: PATIENT IS ALERT, DENIES ANY PAIN OF THE RIGHT FOOT. RIGHT PT AND DP PULSES ARE FULL.  VAC DRESSING WAS REMOVE

## 2017-07-05 NOTE — PROGRESS NOTES
07/05/17  0801   BP: 117/45   Pulse: 89   Resp: 17   Temp: 99.4 °F (37.4 °C)   Patient's vital signs are stable his slightly elevated temperature at 99.4 but no complaints of pain, fever or chills. Objective:  Today evaluated the heel wound with wound c

## 2017-07-05 NOTE — PLAN OF CARE
Problem: Diabetes/Glucose Control  Goal: Glucose maintained within prescribed range  INTERVENTIONS:  - Monitor Blood Glucose as ordered  - Assess for signs and symptoms of hyperglycemia and hypoglycemia  - Administer ordered medications to maintain glucose monitor pain and request assistance  - Assess pain using appropriate pain scale  - Administer analgesics based on type and severity of pain and evaluate response  - Implement non-pharmacological measures as appropriate and evaluate response  - Consider cul

## 2017-07-06 NOTE — DIETARY NOTE
Diabetes Nutrition Knowledge Assessment    Patient educated regarding diabetes diet basics. Discussed carbohydrate foods, portion sizes, and food label reading. Handout given and initial meal plan provided.  Encouraged to attend outpatient diabetes educa

## 2017-07-06 NOTE — DIETARY NOTE
ADULT NUTRITION INITIAL ASSESSMENT    Pt is at moderate nutrition risk. Pt meets malnutrition criteria.       RECOMMENDATIONS TO MD: See Nutrition Intervention     CRITERIA FOR MALNUTRITION DIAGNOSIS:  Criteria for non-severe malnutrition diagnosis: chroni appropriate. ADMITTING DIAGNOSIS: Diabetic foot ulcer  PERTINENT PAST MEDICAL HISTORY: DM, Thyroid disorder, others noted. ANTHROPOMETRICS:  HT: 198.1 cm (6' 6\")       WT: 73.5 kg (162 lb)  BMI: Body mass index is 18.72 kg/m².          BMI Classifi healing, compliance with diet and compliance with medical plan    DIETITIAN FOLLOW UP:  RD to follow up within 7 days.    Conner Escobar, [address], [address] [place] [address]   Clinical Dietitian  [phone]

## 2017-07-06 NOTE — PROGRESS NOTES
07/05/17 2113   BP: 130/55   Pulse: 78   Resp: 16   Temp: 98.5 °F (36.9 °C)   Patient was seen in bed resting comfortably he has already spoken to . The wound VAC is in place it is operating.     Objective: Serous drainage noted no sign of a send

## 2017-07-06 NOTE — PROGRESS NOTES
[facility]    Progress Note      Assessment and Plan:   1. Diabetic foot ulcer– status post debridement and antibiotic. Patient was evaluated by plastic surgery. They do not feel that the foot is salvageable. Amputation was advised.   Julieth Alcocer appreciable. Extremities without clubbing cyanosis nor edema. The right leg is heavily bandaged. Neurologic grossly intact with symmetric tone and strength and reflex. Results:         Wound culture–MSSA, plus multiple organisms identified.     Jose Jesus

## 2017-07-06 NOTE — PROGRESS NOTES
[facility]    Progress Note    Lelojayjay Jeffries Patient Status:  Inpatient    1955 MRN [id number]   Location [facility] 1SW Attending Robyn Cartagena MD   Hosp Day # 7 PCP Estelle Souza MD     Subjective:  Feels ok.   Ov

## 2017-07-06 NOTE — PROGRESS NOTES
INFECTIOUS DISEASE PROGRESS NOTE    Ca Lugo Patient Status:  Inpatient    1955 MRN [id number]   Location [facility] 1SW Attending Nabor Cole MD   Hosp Day # 7 PCP Jose Carlos Dahl MD     Subjective:  Patient seen and examined, R foot cellulitis, improved  # Fever, resolved  # Leukocytosis, improved  # DM    # PCN allergy by history in childhood        Recommendations:     - continue meropenem for now, change to invanz 1gm q24 on d/c  - picc   - 6 weeks iv abx, day #8/42  - cont

## 2017-07-06 NOTE — WOUND PROGRESS NOTE
Message received overnight that wound VAC was removed by MD and needed to be re-applied. This AM wound services notified that wound VAC was reapplied by ASHLEY Higgins overnight. Dr. Rizwan Aguirre is aware. Pt is laying in bed, no family present at this time.  BRADLY patel

## 2017-07-06 NOTE — PROGRESS NOTES
ORTHO SURG: PATIENT'S DESIRE FOR SECOND OPINION NOTED PER DR. ACOSTA'S NOTE. DR. Dewain Cowden WILL BE REQUESTED FOR SECOND OPINION. PATIENT HAS BEEN REMOVED FROM THE \"ADD ON SCHEDULE\" FOR TODAY.  I WILL SEE THE PATIENT FURTHER WHEN REQUESTED TO DO SO. MY NE

## 2017-07-07 NOTE — PROGRESS NOTES
Pulmonary/Critical Care Follow Up Note    HPI:   Maria Elena Dean is a 63year old male with No chief complaint on file.       PCP Francisco Palacios MD  Admission Attending Shola Junior 15 Day #8    Mild pain  No sob  No fever   Mild nausea Min PRN  •  morphINE sulfate (PF) 4 MG/ML injection 6 mg, 6 mg, Intravenous, Q10 Min PRN  •  Normal Saline Flush 0.9 % injection 10 mL, 10 mL, Intravenous, PRN  •  meropenem (MERREM) IVPB 500 mg/100 ml in 0.9% NaCl minibag, 500 mg, Intravenous, Q8H  •  [MA

## 2017-07-07 NOTE — WOUND PROGRESS NOTE
WOUND CARE NOTE      PLAN   Recommendations:  Dr. Rizwan Aguirre and ID currently on consult  Dietary consult for recommendations for nutrition to optimize wound healing  Diabetic Education for optimal glucose control  VAC standing orders  VAC troubleshooting i support provided. RN updated.       Allergies: Penicillins    Labs:     Lab Results  Component Value Date   WBC 12.5 (H) 07/05/2017   HGB 9.5 (L) 07/05/2017   HCT 28.4 (L) 07/05/2017    (H) 07/05/2017   CREATSERUM 0.87 07/05/2017   BUN 17 07/05/2017

## 2017-07-07 NOTE — DISCHARGE PLANNING
3:06pm Update-Script obtained from ID and placed on the chart, in case it is needed over the weekend.  As pt is self pay, his only option is for the R Liusito Weeks 20 [phone]  --  2:10pm Update- SW informed that Navarre Rummage is also recommending

## 2017-07-07 NOTE — PROGRESS NOTES
[facility]    Progress Note    Pamela Delmy Patient Status:  Inpatient    1955 MRN [id number]   Location [facility] 1SW Attending Nola Harris MD   Hosp Day # 8 PCP Chetna Oviedo MD     Subjective:  Feels ok.   Hy

## 2017-07-07 NOTE — PROGRESS NOTES
INFECTIOUS DISEASE PROGRESS NOTE    Derald Mis Patient Status:  Inpatient    1955 MRN [id number]   Location [facility] 1SW Attending Adelaide Estes MD   Hosp Day # 8 PCP Aakash Romero MD     Subjective:  Patient seen and examined, 7/1/17 I&D, partial resection base 5th MT   -cx with staph aureus (MSSA), peptostrep, bacteroides  # R foot cellulitis, improving    -Pt advised by 2 different consultants to have BKA - no decision yet      # Fever, resolved  # Leukocytosis, improving   #

## 2017-07-08 NOTE — PROGRESS NOTES
Pt. Seen and examined today and yesterday. He has full thickness plantar skin loss with necrotic tissue. This limb is not salvageable. Will need BKA. He would like to proceed tomorrow. NPO after midnight.

## 2017-07-08 NOTE — PROGRESS NOTES
[facility]    Progress Note    Logan Hands Patient Status:  Inpatient    1955 MRN [id number]   Location [facility] 1SW Attending Gabriel Salguero MD   Hosp Day # 9 PCP Juan M Mathew MD     Subjective:  He developed

## 2017-07-08 NOTE — PROGRESS NOTES
[facility]    Progress Note    Janna Daily Patient Status:  Inpatient    1955 MRN [id number]   [facility] 2W/SW Attending Francheska Garcia MD   Hosp Day # 9 PCP Ming Page MD     Subjective:     Constitu 07/08/2017   CL 99 07/08/2017   CO2 31 07/08/2017    (H) 07/08/2017   CA 8.3 (L) 07/08/2017   ALB 2.0 (L) 06/29/2017   ALKPHO 176 (H) 06/29/2017   BILT 1.0 06/29/2017   TP 6.1 06/29/2017   AST 23 06/29/2017   ALT 14 (L) 06/29/2017   TSH 20.91 (H) 06

## 2017-07-08 NOTE — PLAN OF CARE
PAIN - ADULT    • Verbalizes/displays adequate comfort level or patient's stated pain goal Progressing    Pain controlled with prescribed pain medications.     Patient Centered Care    • Patient preferences are identified and integrated in the patient's evy

## 2017-07-08 NOTE — PLAN OF CARE
Problem: Diabetes/Glucose Control  Goal: Glucose maintained within prescribed range  INTERVENTIONS:  - Monitor Blood Glucose as ordered  - Assess for signs and symptoms of hyperglycemia and hypoglycemia  - Administer ordered medications to maintain glucose bed, drain sites and surrounding tissue  - Implement wound care per orders     Outcome: Not Progressing      Problem: PAIN - ADULT  Goal: Verbalizes/displays adequate comfort level or patient's stated pain goal  INTERVENTIONS:  - Encourage pt to monitor pa

## 2017-07-08 NOTE — PROGRESS NOTES
INFECTIOUS DISEASE PROGRESS NOTE    Nurys Miller Patient Status:  Inpatient    1955 MRN [id number]   Location [facility] 1SW Attending Luke Street MD   Hosp Day # 9 PCP Brady Ramos MD     Subjective:  Patient seen and examined, partial resection base 5th MT   -cx with staph aureus (MSSA), peptostrep, bacteroides  # R foot cellulitis, improving    -Pt advised by 2 different consultants to have BKA - no decision yet      # Fever, resolved  # Leukocytosis, improving   # DM    # PCN

## 2017-07-09 NOTE — PLAN OF CARE
Problem: Patient/Family Goals  Goal: Patient/Family Long Term Goal  Patient's Long Term Goal: to be discharged    Interventions:  - monitor VS, labs  -wound care  -outpt referrals if needed  -IV meropenem  - See additional Care Plan goals for specific inte ulcer development  - Assess and document skin integrity  - Assess and document dressing/incision, wound bed, drain sites and surrounding tissue  - Implement wound care per orders     Outcome: Progressing      Problem: PAIN - ADULT  Goal: Verbalizes/display

## 2017-07-09 NOTE — ANESTHESIA POSTPROCEDURE EVALUATION
Patient: Maria Elena Dean    Procedure Summary     Date:  07/09/17 Room / Location:  [address] [place] [address] MAIN OR 05 / [facility] MAIN OR    Anesthesia Start:  8626 Anesthesia Stop:      Procedure:  KNEE AMPUTATION ABOVE/BELOW (Right Leg Upper) Diagnosis:       Osteomyelitis, acute

## 2017-07-09 NOTE — PROGRESS NOTES
[facility]    Progress Note    Wing Hatch Patient Status:  Inpatient    1955 MRN [id number]   [facility] 2W/SW Attending Cedric Tejeda MD   Hosp Day # 10 PCP Rita Stern MD     Subjective:     Reyes [facility] Boogie Hsu.  Gissell Casper MD  7/9/2017

## 2017-07-09 NOTE — ANESTHESIA PREPROCEDURE EVALUATION
Anesthesia PreOp Note    HPI:     Rashida Otero is a 63year old male who presents for preoperative consultation requested by: Marcos Meza MD    Date of Surgery: 6/29/2017 - 7/9/2017    Procedure(s):  KNEE AMPUTATION ABOVE/BELOW  Indication: Montse Alejandre Units at 07/08/17 2107    [facility] Hold] Normal Saline Flush 0.9 % injection 10 mL 10 mL Intravenous PRN Lelo Haynes MD 10 mL at 07/08/17 0510    [MAR Hold] dextrose 50% injection 50 mL 50 mL Intravenous PRN Josh Weller MD     [facility] Hold] Betty Enriquez Drug use: No    Sexual activity: Not on file     Other Topics Concern    Caffeine Concern Yes    Comment: coffee, 4 cups daily     Social History Narrative   None on file       Available pre-op labs reviewed. Lab Results  Component Value Date   WBC 14. 5 soft.  Bowel sounds: normal.             Anesthesia Plan:   ASA:  3  Plan:   General  Airway:  LMA and ETT  Informed Consent Plan and Risks Discussed With:  Patient  Provider Attestation (if preop done by other):  BS 99  PONV,GA/LMA. poss ETT. PONV,dental da

## 2017-07-09 NOTE — OPERATIVE REPORT
[facility]    PATIENT'S NAME: Casimiro Brennan   ATTENDING PHYSICIAN: Juan M Mathew MD   OPERATING PHYSICIAN: Jolly Enrique MD   PATIENT ACCOUNT#:   [id number]    LOCATION:  [facility] PACU [address] [address]  MEDICAL RECORD #:   [id number]       DATE Briana Mckeon made it one-third circumference anteriorly and then a large posterior flap was made, dissected down through subcutaneous tissues. The saphenous vein was suture ligated. The left saphenous vein and the sural nerve were controlled with electrocautery.   We

## 2017-07-09 NOTE — PLAN OF CARE
Problem: Diabetes/Glucose Control  Goal: Glucose maintained within prescribed range  INTERVENTIONS:  - Monitor Blood Glucose as ordered  - Assess for signs and symptoms of hyperglycemia and hypoglycemia  - Administer ordered medications to maintain glucose Encourage pt to monitor pain and request assistance  - Assess pain using appropriate pain scale  - Administer analgesics based on type and severity of pain and evaluate response  - Implement non-pharmacological measures as appropriate and evaluate response

## 2017-07-10 PROBLEM — M86.179 ACUTE OSTEOMYELITIS OF FOOT (HCC): Status: ACTIVE | Noted: 2017-01-01

## 2017-07-10 PROBLEM — E46 MALNUTRITION (HCC): Status: ACTIVE | Noted: 2017-01-01

## 2017-07-10 NOTE — PROGRESS NOTES
No complaints  Pain well controlled   07/10/17  0600   BP: 132/56   Pulse: 73   Resp: 12   Temp:      PE - RLE - dressing dry  A/P: POD#1  PT/OT  Dressing change tomorrow

## 2017-07-10 NOTE — PLAN OF CARE
Diabetes/Glucose Control    • Glucose maintained within prescribed range Not Progressing    Pt had high blood sugar reading of > 500 and was restarted on insulin drip per protocol.       DISCHARGE PLANNING    • Discharge to home or other facility with appro

## 2017-07-10 NOTE — PROGRESS NOTES
[facility]    Progress Note    Sonam Burk Patient Status:  Inpatient    1955 MRN [id number]   Location [facility] 1SW Attending Tobias Phelps MD   Hosp Day # 11 PCP Jeannette Negron MD     Subjective:  Feels well.

## 2017-07-10 NOTE — PROGRESS NOTES
[facility]    Progress Note      Assessment and Plan:   1. Diabetic foot ulcer status post right BKA-the patient is doing well clinically. The wound is heavily bandaged.     Recommendations:  1. Diabetic control  2. Meropenem as per ID. 10.4 07/10/2017   HCT 31.8 07/10/2017    07/10/2017   CREATSERUM 0.72 07/10/2017   BUN 14 07/10/2017    07/10/2017   K 4.3 07/10/2017   CL 98 07/10/2017   CO2 32 07/10/2017    07/10/2017   CA 8.2 07/10/2017      Wound culture–MSSA, plus

## 2017-07-10 NOTE — CM/SW NOTE
CTL update for progression of care. Patient is s/p Right BKA on 7/9/17 for Diabetic necrotizing Osteomyelitis  Right foot. Hx: Diabetes. Patient is a 63year old single male who lives with his parents in [facility] who underwent a R BKA by Dr. Beena Leon.

## 2017-07-10 NOTE — WOUND PROGRESS NOTE
Pt is now s/p R BKA. Dr Flavia Mcfarland is following. Please re-consult Wound Care Services if needed.

## 2017-07-10 NOTE — PHYSICAL THERAPY NOTE
PHYSICAL THERAPY EVALUATION - INPATIENT     Room Number: 628/533-Y  Evaluation Date: 7/10/2017  Type of Evaluation: Initial  Physician Order: PT Eval and Treat    Presenting Problem: R BKA  Reason for Therapy: Mobility Dysfunction and Discharge Plannin Daily       PHYSICAL THERAPY MEDICAL/SOCIAL HISTORY     History related to current admission: The pt is a 63year old male who presented to the hospital with a malodorous foot wound and is s/p BKA     Problem List  Principal Problem:    Diabetic foot ulcer '6-Clicks' INPATIENT SHORT FORM - BASIC MOBILITY  How much difficulty does the patient currently have. ..  -   Turning over in bed (including adjusting bedclothes, sheets and blankets)?: A Little   -   Sitting down on and standing up from a chair with arms Goal #2  Current Status    Goal #3 Patient is able to ambulate 50 feet with assist device: walker - rolling at assistance level: modified independent   Goal #3   Current Status    Goal #4 Patient to demonstrate independence with home activity/exercise in

## 2017-07-10 NOTE — PROGRESS NOTES
INFECTIOUS DISEASE PROGRESS NOTE    Panfilo Alan Patient Status:  Inpatient    1955 MRN [id number]   Location [facility] 1SW Attending Darell Hernandez MD   Hosp Day # 11 PCP Otis Reardon MD     Subjective:  Patient seen and examined ordered.   ID consulted.     # R diabetic foot infection  # Infected R lateral foot ulcer with underlying 5th MT osteomyelitis   -s/p 7/1/17 I&D, partial resection base 5th MT   -cx with staph aureus (MSSA), peptostrep, bacteroides   -s/p 7/9/17 R BKA  # R

## 2017-07-10 NOTE — PLAN OF CARE
Problem: SAFETY ADULT - FALL  Goal: Free from fall injury  INTERVENTIONS:  - Assess pt frequently for physical needs  - Identify cognitive and physical deficits and behaviors that affect risk of falls.   - [place] fall precautions as indicated by assessme type and severity of pain and evaluate response  - Implement non-pharmacological measures as appropriate and evaluate response  - Consider cultural and social influences on pain and pain management  - Manage/alleviate anxiety  - Utilize distraction and/or

## 2017-07-10 NOTE — OCCUPATIONAL THERAPY NOTE
OCCUPATIONAL THERAPY EVALUATION - INPATIENT      Room Number: 634/098-R  Evaluation Date: 7/10/2017  Type of Evaluation: Initial  Presenting Problem: Necrotizing R foot infection and OM, S/P R BKA.      Physician Order: IP Consult to Occupational Therapy  R Diabetes ([facility] [address]    • Disorder of thyroid     hyperthyroidism   • Prolonged QT interval    • Thyroid disease        Past Surgical History  Past Surgical History:  No date: CHOLECYSTECTOMY  No date: HERNIA SURGERY    HOME SITUATION  Type of Home: [facility] ASSESSMENT  Supine to Sit : Supervision  Sit to Stand: Minimum assistance    Toilet Transfer: NT  Shower Transfer: NT  Chair Transfer: Minimal assist with cues to sequence walker and feet.   Car Transfer: NT    Bedroom Mobility: Took several steps to the [employer]

## 2017-07-11 NOTE — PROGRESS NOTES
[facility]    Progress Note    Pamela Delmy Patient Status:  Inpatient    1955 MRN [id number]   Location [facility] 1SW Attending Nola Harris MD   Hosp Day # 12 PCP Chetna Oviedo MD     Subjective:  Feels well.

## 2017-07-11 NOTE — PROGRESS NOTES
INFECTIOUS DISEASE PROGRESS NOTE    Bella Langemaik Patient Status:  Inpatient    1955 MRN [id number]   Location [facility] 1SW Attending Dwain Jones MD   Hosp Day # 12 PCP Jody Matos MD     Subjective:  Patient seen and examined with foul odor. Patient has PCN allergy but unsure significance and was in childhood. R foot xray ordered.   ID consulted.     # R diabetic foot infection  # Infected R lateral foot ulcer with underlying 5th MT osteomyelitis   -s/p 7/1/17 I&D, partial res

## 2017-07-11 NOTE — PROGRESS NOTES
No complaints. No pain.    07/11/17  1201   BP: 126/54   Pulse: 79   Resp: 18   Temp: 98.3 °F (36.8 °C)     Splint clean and dry  A/P: POD#2  PT/OT  Stump  to be placed by Peter today  Dry dressing changes daily  OK to d/c from ortho standpoint  F

## 2017-07-11 NOTE — OCCUPATIONAL THERAPY NOTE
OCCUPATIONAL THERAPY TREATMENT NOTE - INPATIENT     Room Number: 622/602-O         Presenting Problem:  (s/p RT BKA on 7/9)    Problem List  Principal Problem:    Diabetic foot ulcer ([facility] [address]  Active Problems:    Type II diabetes mellitus ([facility] [address]    Hyperthyroi includes using toilet, bedpan or urinal? : A Little  -   Putting on and taking off regular upper body clothing?: None  -   Taking care of personal grooming such as brushing teeth?: None  -   Eating meals?: None    AM-PAC Score:  Score: 21  Approx Degree of

## 2017-07-11 NOTE — PLAN OF CARE
Diabetes/Glucose Control    • Glucose maintained within prescribed range Progressing    Pt off of insulin drip, accuchecks ordered every AC/HS with insulin sliding scale order.     DISCHARGE PLANNING    • Discharge to home or other facility with appropriate

## 2017-07-11 NOTE — CONSULTS
PHYSICAL MEDICINE AND REHABILITATION CONSULTATION     CC: Impaired mobility and ADL dysfunction secondary to right BKA    HPI:   This is a 62M patient with a history of diabetes mellitus type 1 and hyperthyroidism.   Patient presented with an infected diabe Oral BID   PEG 3350 (MIRALAX) powder packet 17 g 17 g Oral Daily PRN   magnesium hydroxide (MILK OF MAGNESIA) 400 MG/5ML suspension 30 mL 30 mL Oral Daily PRN   bisacodyl (DULCOLAX) rectal suppository 10 mg 10 mg Rectal Daily PRN   FLEET ENEMA (FLEET) 7-19 (LEVEMIR) 100 UNIT/ML flextouch 40 Units 40 Units Subcutaneous Once   Pantoprazole Sodium (PROTONIX) EC tab 40 mg 40 mg Oral QAM AC   [] Atropine Sulfate 0.1 MG/ML injection 0.5 mg 0.5 mg Intravenous PRN   [] ondansetron HCl (ZOFRAN) injectio Social History Main Topics    Smoking status: Former Smoker                                                                Packs/day: 0.00      Years: 10.00          Types: Cigars       Quit date: 1/19/1995    Smokeless tobacco: Never Used 07/11/2017   PGLU 122 07/11/2017             ASSESSMENT:    REHAB DIAGNOSIS:  1. Impaired mobility/ADL dysfunction secondary to left BKA  2.brittle diabetic type 1 with diabetic neuropathy   3.hyperthyroidism  4.postop anemia  5.postop pain  6.  Fall risk

## 2017-07-11 NOTE — PHYSICAL THERAPY NOTE
PHYSICAL THERAPY TREATMENT NOTE - INPATIENT    Room Number: 467/467-A       Presenting Problem: R BKA    Problem List  Principal Problem:    Diabetic foot ulcer ([facility] [address]  Active Problems:    Type II diabetes mellitus ([facility] [address]    Hyperthyroidism    Sx 7/9/17, CP PAIN ASSESSMENT   Ratin       BALANCE                                                                                                                     Static Sitting: Fair +  Dynamic Sitting: Fair +           Static Standing: Poor +  Dynamic Sta Goal #2 Patient is able to demonstrate transfers Sit to/from Stand at assistance level: modified independent with walker - rolling     Goal #2  Current Status Min A for transfers with assist to facilitate forward lean and hip extension   Goal #3 Patient

## 2017-07-11 NOTE — PROGRESS NOTES
[facility]    Progress Note      Assessment and Plan:   1. Diabetic foot ulcer status post right BKA-the patient is doing well clinically. The wound is heavily bandaged.   The patient has no other new clinical complaints.     Recommendatio symmetric tone and strength and reflex.      Results:       Lab Results  Component Value Date   WBC 16.1 07/11/2017   HGB 9.8 07/11/2017   HCT 29.7 07/11/2017    07/11/2017   CREATSERUM 0.74 07/11/2017   BUN 14 07/11/2017    07/11/2017   K 3.5

## 2017-07-11 NOTE — PLAN OF CARE
Patient blood sugar was elevated last noc but today doing much better. No longer requiring insulin gtt at this time.

## 2017-07-12 NOTE — PHYSICAL THERAPY NOTE
PHYSICAL THERAPY TREATMENT NOTE - INPATIENT    Room Number: 467/467-A       Presenting Problem: R BKA    Problem List  Principal Problem:    Diabetic foot ulcer ([facility] [address]  Active Problems:    Type II diabetes mellitus ([facility] [address]    Hyperthyroidism    Sx 7/9/17, CP training    SUBJECTIVE  \"I am feeling good today\"    OBJECTIVE  Precautions: Limb alert - right (R LE BKA.)    WEIGHT BEARING RESTRICTION  Weight Bearing Restriction: R lower extremity        R Lower Extremity: Non-Weight Bearing       PAIN ASSESSMENT Extremity Glut sets  Hip AB/AD  Quad sets  SLR  knee flexion to tolerance     Position Sitting and Supine       Patient End of Session: Up in chair;Needs met;Call light within reach;RN aware of session/findings; All patient questions and concerns addressed

## 2017-07-12 NOTE — PROGRESS NOTES
[facility] [place] [facility]    [facility] Note    Panfilo Alan Patient Status:  Inpatient    1955 MRN [id number]   Location [facility] 1SW Attending Darell Hernandez MD   Hosp Day # 15 PCP Otis Reardon MD     Subjective:  Feels well.

## 2017-07-12 NOTE — PROGRESS NOTES
[facility] - [facility]    Progress Note      Assessment and Plan:   1. Diabetic foot ulcer status post right BKA-the patient is doing well clinically. The wound is heavily bandaged. The patient has no other new clinical complaints.   The patient is w bandaged status post amputation of right foot. Neurologic grossly intact with symmetric tone and strength and reflex.      Results:       Lab Results  Component Value Date   WBC 12.3 07/12/2017   HGB 9.7 07/12/2017   HCT 29.5 07/12/2017    07/12/201

## 2017-07-12 NOTE — DISCHARGE PLANNING
7/12Wil informed this Writer that the Patient can apply for [facility] and provided this Writer with an application. This Writer met with the Patient at bedside in regards to [facility].  The Patient is agreeable to completed the application and f

## 2017-07-13 NOTE — DISCHARGE PLANNING
7/13CM-This Writer completed his [employer] application for [employer]. This Writer has sent it to [facility]. Case Management is waiting for [facility] final decision and will update as information becomes available.  This Writer has informed the Patient's RN of

## 2017-07-13 NOTE — PHYSICAL THERAPY NOTE
PHYSICAL THERAPY TREATMENT NOTE - INPATIENT    Room Number: 467/467-A       Presenting Problem: R BKA    Problem List  Principal Problem:    Diabetic foot ulcer ([facility] [address]  Active Problems:    Type II diabetes mellitus ([facility] [address]    Hyperthyroidism    Sx 7/9/17, CP air    AM-PAC '6-Clicks' INPATIENT SHORT FORM - BASIC MOBILITY  How much difficulty does the patient currently have. ..  -   Turning over in bed (including adjusting bedclothes, sheets and blankets)?: A Little   -   Sitting down on and standing up from a ch understanding of exercises   Goal #5     Goal #5   Current Status     Goal #6     Goal #6  Current Status

## 2017-07-13 NOTE — PROGRESS NOTES
[facility]    Progress Note    Froylan Chadwick Patient Status:  Inpatient    1955 MRN [id number]   Location [facility] 1SW Attending Sue Spaulding MD   Hosp Day # 15 PCP Jeannette Snáchez MD     Subjective:  Feels ok, ea

## 2017-07-13 NOTE — PROGRESS NOTES
[facility]    Progress Note    Ana Delgado Patient Status:  Inpatient    1955 MRN [id number]   Location [facility] 1SW Attending Georganne Meckel, MD   Hosp Day # 14 PCP Lenore Valera MD     Subjective:  Feels ok, ea

## 2017-07-13 NOTE — PROGRESS NOTES
[facility]ICCI [facility]O [facility] [place] [facility]"" - [facility]    Progress Note      Assessment and Plan:   1. Diabetic foot ulcer status post right BKA-the patient is slowly improving. He is otherwise well from my perspective to be discharged to rehabilitation.   We are awaiting final appr Extremities without clubbing cyanosis nor edema. The right leg is heavily bandaged status post amputation of right foot. Neurologic grossly intact with symmetric tone and strength and reflex.      Results:       Wound culture–MSSA, plus multiple organis

## 2017-07-14 NOTE — PROGRESS NOTES
Breakfast glucose 455. Per Dr Brandi Mccormack, give patient the scheduled 5 units of novolog and the max amount per sliding scale.

## 2017-07-14 NOTE — PHYSICAL THERAPY NOTE
PHYSICAL THERAPY TREATMENT NOTE - INPATIENT    Room Number: 467/467-A       Presenting Problem: R BKA    Problem List  Principal Problem:    Diabetic foot ulcer ([facility] [address]  Active Problems:    Type II diabetes mellitus ([facility] [address]    Hyperthyroidism    Sx 7/9/17, CP Static Sitting: Good  Dynamic Sitting: Fair +           Static Standing: Fair  Dynamic Standing: Fair -    ACTIVITY TOLERANCE  On room air. Improved from 100' to 150' today.      AM-PAC '6-Clicks' INPATIENT SHORT FORM - B Status CGA   Goal #3 Patient is able to ambulate 50 feet with assist device: walker - rolling at assistance level: modified independent   Goal #3   Current Status ' w/ RW and guidance for IV pole.    NEW GOAL: 200' w/ rw with SBA with standing rest b

## 2017-07-14 NOTE — PROGRESS NOTES
[facility]    Progress Note    Cristopher Hurt Patient Status:  Inpatient    1955 MRN [id number]   Location [facility] 4W/SW/SE Attending Raf Gaytan MD   Hosp Day # 15 PCP Anny Hinojosa MD     Subjective:     Cons

## 2017-07-14 NOTE — DIETARY NOTE
ADULT NUTRITION RE-ASSESSMENT    Pt is at moderate nutrition risk. Pt meets malnutrition criteria.       RECOMMENDATIONS TO MD: See Nutrition Intervention     CRITERIA FOR MALNUTRITION DIAGNOSIS:  Criteria for non-severe malnutrition diagnosis: chronic ill up  - Coordination of nutrition care: collaboration with other providers  - Discharge and transfer of nutrition care to new setting or provider:   to determine as appropriate, plans for [facility] rehab    ADMITTING DIAGNOSIS: Diabetic foot u grams protein per kg/ABW)    MONITOR AND EVALUATE/NUTRITION GOALS:  - Food and Nutrient Intake:   Monitor: adequacy of PO intake and adequacy of supplement intake.   - Anthropometric Measurement:   Monitor: wt  - Nutrition Goals: halt wt loss, true wt gain,

## 2017-07-14 NOTE — DISCHARGE PLANNING
7/14CM-Wilson is still reviewing the Patient's application and will follow up with Case Management. This Writer informed the Patient's RN of the above.      -[facility] [id number]

## 2017-07-14 NOTE — DISCHARGE PLANNING
7/14CM-Financial Counseling provided this Writer with the Patient's Medicaid number [id number]. This Writer has informed Maurilio Paulino of the above, they are able to accept the Patient, but his glucose levels need to go down.  This Writer informed the Patient's

## 2017-07-15 NOTE — PROGRESS NOTES
[facility]    Progress Note      Assessment and Plan:   1. Diabetic foot ulcer status post right BKA-the patient is slowly improving. He is otherwise well from my perspective to be discharged to rehabilitation.   The patient has been accep without hepatosplenomegaly and no mass appreciable. Extremities without clubbing cyanosis nor edema. The right leg is heavily bandaged status post amputation of right foot. Neurologic grossly intact with symmetric tone and strength and reflex.      Resu

## 2017-07-15 NOTE — PROGRESS NOTES
[facility]    Progress Note    Juanito Del Valle Patient Status:  Inpatient    1955 MRN [id number]   Location [facility] 1SW Attending Adelaide Estes MD   Hosp Day # 16 PCP Aakash Romero MD     Subjective:  Feels ok, ea

## 2017-07-15 NOTE — PLAN OF CARE
Problem: Diabetes/Glucose Control  Goal: Glucose maintained within prescribed range  INTERVENTIONS:  - Monitor Blood Glucose as ordered  - Assess for signs and symptoms of hyperglycemia and hypoglycemia  - Administer ordered medications to maintain glucose INTEGRITY - ADULT  Goal: Incision(s), wounds(s) or drain site(s) healing without S/S of infection  INTERVENTIONS:  - Assess and document risk factors for pressure ulcer development  - Assess and document skin integrity  - Assess and document dressing/incis gomez, dr Delaney Sagastume notifed, will repeat at 0430, possible d/c today to [facility] if blood sugars are controlled, frequent rounding done, call light within reach of patient, will continue to monitor.

## 2017-07-15 NOTE — PLAN OF CARE
Glucose maintained within prescribed range Progressing      Discharge to home or other facility with appropriate resources Progressing      Verbalizes/displays adequate comfort level or patient's stated pain goal Progressing      Patient preferences are id

## 2017-07-16 NOTE — PLAN OF CARE
Diabetes/Glucose Control    • Glucose maintained within prescribed range Adequate for Discharge        DISCHARGE PLANNING    • Discharge to home or other facility with appropriate resources Adequate for Discharge        PAIN - ADULT    • Verbalizes/display

## 2017-07-16 NOTE — PLAN OF CARE
Diabetes/Glucose Control    • Glucose maintained within prescribed range Not Progressing    Glucose levels still very labile; patient refused dinner-time insulin with accucheck of 90. Bedtime accucheck was 221 (no insulin coverage), 2am accucheck was 147.

## 2017-07-16 NOTE — DISCHARGE SUMMARY
Discharge Summary    Date of Discharge: July 16, 2017. Date of admission: 6/29/17    Hospital Course:    the patient was admitted with a severe diabetic foot ulcer. He received extensive debridement per podiatry.   The patient was ultimately evaluated by insulin detemir 100 UNIT/ML Sopn  Commonly known as:  LEVEMIR  Inject 22 Units into the skin daily. magnesium hydroxide 400 MG/5ML Susp  Commonly known as:  MILK OF MAGNESIA  Take 30 mL by mouth daily as needed for constipation.      Pantoprazole [facility] rehabilitation    Discharge Diagnosis:  Diabetic foot ulcer, status post right below the knee amputation, osteomyelitis, diabetes mellitus, hypothyroidism      Elen Cooper MD  Medical Director, Critical Care, [address] [place] [address]  Medical Director, [facility]

## 2017-07-16 NOTE — DISCHARGE PLANNING
7/16/17  Discharge planning   Spoke with Mir Huber at [address], bed available today at 5:30pm room 1110 , RN report [phone], transport arranged via [facility]. Pt aware and in agreement with above rehab transfer.   Verlee Barthel X [phone]

## 2017-07-16 NOTE — PROGRESS NOTES
Report given to [address] [place] [address] at [facility]. Blood sugar was 65 before dinner, orange juice and dinner given to patient. Patient asymptomatic. Blood sugar remained 65, orange juice given again. Blood sugar 81.  Dr. Jewell Mcgee notified and gave order to given 4 u

## 2017-07-16 NOTE — PLAN OF CARE
Patient with temp of 101.3 this a.m. Dr. Lauren Joseph notified - blood cultures drawn, chest x-ray done. Also notified Dr. Lauren Joseph that patient was only able to void 75cc last night - bladder scan done as ordered and showed 30ml of urine.  Dr. Renuka Ventura notifie

## 2017-07-17 NOTE — PROGRESS NOTES
[facility]    Progress Note    Panfilo Alan Patient Status:  Inpatient    1955 MRN [id number]   Location [facility] 1SW Attending Darell Hernandez MD   Hosp Day # 16 PCP Otis Reardon MD     Subjective:  Feels ok, ea

## 2017-08-04 NOTE — TELEPHONE ENCOUNTER
Aminah/[facility] called to inform [address] [place] [address] that they admitted the pt to Home Health Today.  Thank You

## 2017-08-07 NOTE — PROGRESS NOTES
The patient is 72-year-old male who comes in now for follow-up after emergent hospitalization for a necrotic diabetic foot ulcer necessitating amputation of the right foot. The patient is doing well clinically and he has just left [facility] 4 days ago.   H

## 2017-08-07 NOTE — TELEPHONE ENCOUNTER
Pt mother Abbi Single reports pt currently with Home Health Nurse, pt was [facility] to [facility], Abbi Single reports: Hematocrit 31.8, RBC 3.5, and Hemoglobin 10.1.  Informed Brittany jimenez msg will be sent to Dr. Herminia Juarez, she

## 2017-08-15 NOTE — TELEPHONE ENCOUNTER
Called Jayson. His blood sugar is now 582. He confirms that he took 27 units of humalog at 4pm.     Consulted with AM. She advised for the patient to take another 10 units of humalog now and check blood sugar again in 2 hours (8pm).  He is to page if his bl

## 2017-08-15 NOTE — TELEPHONE ENCOUNTER
Patient called back. He states that he checking his sugar now and it is 585. Per AM patient is not to take any more insulin at this time as it peaks in 1.5 hours. He should check his sugar at 6pm and RN will call him at that time.

## 2017-08-15 NOTE — TELEPHONE ENCOUNTER
Called pt for BG. He states he just got home 10 minutes ago because he stopped at the lab. He checked his sugar when he got home and it still read \"HI\". He took 27 units of short acting insulin.  He will recheck sugar in 30 min and contact the office with

## 2017-08-15 NOTE — H&P
Orders Placed This Encounter      POC Finger stick glucose [49416]      Direct LDL      Microalb/Creat Ratio, Random Urine [E]      8/15/2017  Nicol Padilla MD

## 2017-08-16 NOTE — TELEPHONE ENCOUNTER
Edwige calling to inform [address] [place] [address] that pt fell while he was trying to turn off lamp from bed. Pls call. Thank you.

## 2017-08-17 PROBLEM — N17.9 ACUTE KIDNEY INJURY (HCC): Status: ACTIVE | Noted: 2017-01-01

## 2017-08-17 PROBLEM — E10.10 TYPE 1 DIABETES MELLITUS WITH KETOACIDOSIS WITHOUT COMA (HCC): Status: ACTIVE | Noted: 2017-01-01

## 2017-08-17 PROBLEM — E87.2 METABOLIC ACIDOSIS: Status: ACTIVE | Noted: 2017-01-01

## 2017-08-17 PROBLEM — E87.20 METABOLIC ACIDOSIS: Status: ACTIVE | Noted: 2017-01-01

## 2017-08-17 PROBLEM — R73.9 HYPERGLYCEMIA: Status: ACTIVE | Noted: 2017-01-01

## 2017-08-17 PROBLEM — R79.89 AZOTEMIA: Status: ACTIVE | Noted: 2017-01-01

## 2017-08-17 PROBLEM — E87.1 HYPONATREMIA: Status: ACTIVE | Noted: 2017-01-01

## 2017-08-17 NOTE — ED INITIAL ASSESSMENT (HPI)
Pt \"saw PCP Tuesday and received a phone call today that his [place] is high and he is in ketoacidosis\". Pt c/o dry mouth. Pt denies taking insulin this morning.

## 2017-08-17 NOTE — ED PROVIDER NOTES
Patient Seen in: [address] [place] [address] Emergency Department    History   Patient presents with:  Hyperglycemia (metabolic)    Stated Complaint: hyperglycemia    HPI    Patient presents to the emergency department after being sent here by his endocrinologist (three) times daily with meals. bisacodyl 10 MG Rectal Suppos,  Place 1 suppository (10 mg total) rectally daily as needed. docusate sodium 100 MG Oral Cap,  Take 100 mg by mouth 2 (two) times daily.    magnesium hydroxide 400 MG/5ML Oral Suspension,  T regular rhythm. No murmur heard. Pulmonary/Chest: Effort normal and breath sounds normal. No respiratory distress. Abdominal: Soft. He exhibits no distension. There is no tenderness. Musculoskeletal: Normal range of motion.    There is a right-sided Dr. Saúl Kwon and Dr. Anu Lawson. Insulin drip initiated in ED.     Disposition and Plan     Clinical Impression:  Hyperglycemia  (primary encounter diagnosis)  Type 1 diabetes mellitus with ketoacidosis without coma (HCC)    Disposition:  There is no disposition on

## 2017-08-17 NOTE — TELEPHONE ENCOUNTER
I added BMP to his labs from two days ago, please confirm it was added. Also, ask him how his BG are doing?   Also, he is on MMi 5 mg daily , please confirm, Based on labs, I recommned stopping it for 10 days and then resuming at 2.5 mg daily  Repeat TSH,

## 2017-08-17 NOTE — TELEPHONE ENCOUNTER
Please call patient - labs demonstrate that he is in DKA and he needs to go to ED emergently. He should likely call 911 unless family can bring him to ED.

## 2017-08-17 NOTE — H&P
[facility]    History & Physical    Date:  8/17/2017   Date of Admission:  8/17/2017      Chief Complaint:   Wing Hatch is a(n) 63year old male with diabetes mellitus out of control.     HPI:   The patient has a history of labile diabet daily with meals. LISINOPRIL 5 MG Oral Tab TAKE 1 TABLET BY MOUTH EVERY DAY   Insulin Lispro 100 UNIT/ML Subcutaneous Solution Take based on Carb ratio.  Up to 50 units a day   Insulin Syringe-Needle U-100 ([employer]ICARE INSULIN SYRINGE) 31G X 5/16\" 1 ML Does 08/17/2017    08/17/2017   CA 9.2 08/17/2017       Assessment/Plan:   1. Diabetes mellitus out of control–the patient has very labile diabetes now with mild elevation of anion gap and a bicarb of 15 with mild DKA.   Also may have a component of type

## 2017-08-17 NOTE — TELEPHONE ENCOUNTER
Late entry- called patient at 10:15am and informed him of results. He is agreeable to ED evaluation and family member will bring him. Discussed importance of going urgently. Called again at 11:15 to follow up and per mother patient is on his way.      P

## 2017-08-17 NOTE — ED NOTES
Pt alert and oriented and is here because of abnormal lab tests. BS was elevated along with the blood PH.  Pt does not have any complaints

## 2017-08-18 NOTE — OCCUPATIONAL THERAPY NOTE
OCCUPATIONAL THERAPY EVALUATION - INPATIENT     Room Number: 222/222-A  Evaluation Date: 8/18/2017  Type of Evaluation: Initial  Presenting Problem:  (ketoacidosis )    Physician Order: IP Consult to Occupational Therapy  Reason for Therapy: ADL/IADL Dysfu TOLERANCE  Room air    COGNITION  Overall Cognitive Status:  WFL - within functional limits    RANGE OF MOTION   Upper extremity ROM is within functional limits     STRENGTH ASSESSMENT  Upper extremity strength is within functional limits     COORDINATION

## 2017-08-18 NOTE — CONSULTS
[facility]    Report of Consultation    Maggie Yusuf Patient Status:  Inpatient    1955 MRN [id number]   Location [facility] 2W/SW Attending Lelo Haynes MD   Hosp Day # 1 PCP Artice Simmonds, MD     Date of Admissi Continuous  •  lisinopril (PRINIVIL,ZESTRIL) tab 5 mg, 5 mg, Oral, Daily  •  methimazole (TAPAZOLE) tab 5 mg, 5 mg, Oral, Daily  •  Normal Saline Flush 0.9 % injection 3 mL, 3 mL, Intravenous, PRN  •  Heparin Sodium (Porcine) 5000 UNIT/ML injection 5,000 U

## 2017-08-18 NOTE — PHYSICAL THERAPY NOTE
PHYSICAL THERAPY EVALUATION - INPATIENT     Room Number: 222/222-A  Evaluation Date: 8/18/2017  Type of Evaluation: Initial  Physician Order: PT Eval and Treat    Presenting Problem: R BKA  Reason for Therapy: Mobility Dysfunction and Discharge Ernestinan elevated blood sugars. 2 days ago in the office he had an evaluation at some outpatient blood which showed a blood sugar of 700.   He has been increasing his insulin at the recommendation of his endocrinologist but based on that number and is persistently strength is within functional limits     BALANCE  Static Sitting: Normal  Dynamic Sitting: Normal  Static Standing: Fair  Dynamic Standing: Fair      ACTIVITY TOLERANCE  No shortness of breath    AM-PAC '6-Clicks' INPATIENT SHORT FORM - BASIC MOBILITY  How Goal #3   Current Status    Goal #4 Patient will negotiate 8 stairs/one curb w/ assistive device and supervision   Goal #4   Current Status    Goal #5 Patient to demonstrate independence with home activity/exercise instructions provided to patient in pre

## 2017-08-18 NOTE — PLAN OF CARE
DISCHARGE PLANNING    • Discharge to home or other facility with appropriate resources Progressing    Transfers to fifth floor     METABOLIC/FLUID AND ELECTROLYTES - ADULT    • Glucose maintained within prescribed range Progressing        SAFETY ADULT - FA

## 2017-08-18 NOTE — DIABETES ED
[facility]   Diabetes Education Note    Ca Lugo  Patient Status Inpatient    1955  Location [facility] 2W/SW  Attending Nabor Cole MD  Hospital Day # 1  PCP  Jose Carlos Dahl MD      Reason for Visit:  Diabetic

## 2017-08-18 NOTE — PROGRESS NOTES
[facility]    Progress Note    Colin Valdez Patient Status:  Inpatient    1955 MRN [id number]   Location [facility] 2W/SW Attending Tanner Wang MD   Hosp Day # 1 PCP Rita Castaneda MD     Subjective:     Constitu HGB 12.0 (L) 08/17/2017   HCT 36.6 (L) 08/17/2017    08/17/2017   CREATSERUM 1.24 08/17/2017   BUN 37 (H) 08/17/2017    (L) 08/17/2017   K 4.0 08/17/2017    08/17/2017   CO2 15 (L) 08/17/2017    (H) 08/17/2017   CA 9.2 08/17/201

## 2017-08-19 NOTE — PROGRESS NOTES
[facility]    Progress Note    Saritha Plattsburg Patient Status:  Inpatient    1955 MRN [id number]   Location [facility]5W Attending Karen Carrillo MD   Hosp Day # 2 PCP Deepthi Dos Santos MD     Subjective:  Feels okay  BG diet    We will follow. Long term plan: ipro will be placed in the clinic.         Jayson Baker  8/19/2017  8:05 AM

## 2017-08-19 NOTE — PLAN OF CARE
Problem: SAFETY ADULT - FALL  Goal: Free from fall injury  INTERVENTIONS:  - Assess pt frequently for physical needs  - Identify cognitive and physical deficits and behaviors that affect risk of falls.   - [place] fall precautions as indicated by assessme Not Progressing  Hypoglycemic events during shift, md notified, new orders placed, patient asymptomatic, will continue to monitor.

## 2017-08-20 NOTE — PROGRESS NOTES
[facility]     Progress Note        Cristopher Hurt Patient Status:  Inpatient    1955 MRN [id number]   Location [facility]5W Attending Raf Gaytan MD   Hosp Day # 3 PCP Anny Hinojosa MD       Subjective:   Patient s Assessment   1. DKA, resolved  2. Uncontrolled diabetes mellitus  3. Acute kidney injury, stable  4. Status post recent right BKA  5.   Hyperthyroidism     Plan   -Patient presents after evidence of DKA although improved from hyperglycemia sta

## 2017-08-20 NOTE — PLAN OF CARE
Problem: SAFETY ADULT - FALL  Goal: Free from fall injury  INTERVENTIONS:  - Assess pt frequently for physical needs  - Identify cognitive and physical deficits and behaviors that affect risk of falls.   - [place] fall precautions as indicated by assessme Progressing  Vss, dr Raf Hayes notified of hs accucheck 206. Repeat acuchek at 0100 as ordered was 131. Will continue to monitor.

## 2017-08-20 NOTE — PHYSICAL THERAPY NOTE
PHYSICAL THERAPY TREATMENT NOTE - INPATIENT    Room Number: 520/520-A       Presenting Problem: R BKA    Problem List  Principal Problem:    Hyperglycemia  Active Problems:    Hyponatremia    Acute kidney injury ([facility] [address]    Azotemia    Metabolic acidosis the patient currently need. ..   -   Moving to and from a bed to a chair (including a wheelchair)?: A Little   -   Need to walk in hospital room?: A Little   -   Climbing 3-5 steps with a railing?: A Little    AM-PAC Score:  Raw Score: 20   PT Approx Degree

## 2017-08-20 NOTE — PLAN OF CARE
Problem: SAFETY ADULT - FALL  Goal: Free from fall injury  INTERVENTIONS:  - Assess pt frequently for physical needs  - Identify cognitive and physical deficits and behaviors that affect risk of falls.   - [place] fall precautions as indicated by assessme within target range  - Assess barriers to adequate nutritional intake and initiate nutrition consult as needed  - Instruct patient on self management of diabetes   Outcome: Progressing  Blood glucose monitored as ordered, insulin administered as ordered.  Kumar Morrell

## 2017-08-20 NOTE — PHYSICAL THERAPY NOTE
Put on Mya Bazan' schedule 8/21 for stair negotiation. Recommend home with out patient therapy.    BRADLY SWAN

## 2017-08-21 NOTE — TELEPHONE ENCOUNTER
Darcie West from [facility] called to inform [address] [place] [address] pt fell last week and had skin tear on left leg, but reports pt did not hit head. Tried to call pt to f/u, pt mother stsamina pt is at [facility] Room 520. Msg routed to [address] [place] [address] as FYI.

## 2017-08-21 NOTE — PROGRESS NOTES
[facility]P - [facility]    Progress Note    Jolie Stafford Patient Status:  Inpatient    1955 MRN [id number]   Location [facility] Attending Kiah Huerta MD   Hosp Day # 4 PCP Chyrl Boas, MD     Subjective:  Feels okay

## 2017-08-21 NOTE — PLAN OF CARE
Problem: SAFETY ADULT - FALL  Goal: Free from fall injury  INTERVENTIONS:  - Assess pt frequently for physical needs  - Identify cognitive and physical deficits and behaviors that affect risk of falls.   - [place] fall precautions as indicated by assessme Not Progressing      Problem: PAIN - ADULT  Goal: Verbalizes/displays adequate comfort level or patient's stated pain goal  INTERVENTIONS:  - Encourage pt to monitor pain and request assistance  - Assess pain using appropriate pain scale  - Administer anal

## 2017-08-21 NOTE — PHYSICAL THERAPY NOTE
PHYSICAL THERAPY TREATMENT NOTE - INPATIENT    Room Number: 520/520-A       Presenting Problem: R BKA    Problem List  Principal Problem:    Hyperglycemia  Active Problems:    Hyponatremia    Acute kidney injury ([facility] [address]    Azotemia    Metabolic acidosis and blankets)?: None   -   Sitting down on and standing up from a chair with arms (e.g., wheelchair, bedside commode, etc.): A Little   -   Moving from lying on back to sitting on the side of the bed?: None   How much help from another person does the dewayne Current Status IN PROGRESS   Goal #6    Goal #6  Current Status

## 2017-08-21 NOTE — DISCHARGE SUMMARY
Discharge Summary    Date of Discharge: 8/21/2017    Hospital Course: The patient was admitted with diabetic ketoacidosis. He was on an insulin drip for a period of time in the intensive care unit and was followed closely by endocrinology.   He then was known as:  LANTUS     Insulin Lispro 100 UNIT/ML Soln  Commonly known as:  HUMALOG           Where to Get Your Medications      These medications were sent to [address] [place] [address],[address] [phone], [phone]

## 2017-08-21 NOTE — PLAN OF CARE
Problem: SAFETY ADULT - FALL  Goal: Free from fall injury  INTERVENTIONS:  - Assess pt frequently for physical needs  - Identify cognitive and physical deficits and behaviors that affect risk of falls.   - [place] fall precautions as indicated by assessme as needed  - Instruct patient on self management of diabetes   Outcome: Adequate for Discharge  Blood sugar monitored as ordered, no hypoglycemic events.      Problem: PAIN - ADULT  Goal: Verbalizes/displays adequate comfort level or patient's stated pain g

## 2017-08-21 NOTE — DISCHARGE PLANNING
SW was informed that pt will be needing [employer] for ride home. DENZEL contacted [facility] for [facility] - 3p pickup. DENZEL informed DEREK Blake, [address]

## 2017-08-22 NOTE — HOME CARE LIAISON
PATIENT FOLLOWED BY [facility]. OBTAINED VERBAL ORDER FROM DR Dominga Teresa TO [facility]. CONTACTED PATIENT'S MOTHER, ([phone]), TO INFORM, RESIDENTIAL STAFF WILL FOLLOW UP WITH PATIENT.

## 2017-08-24 NOTE — TELEPHONE ENCOUNTER
Patient seen recently in hospital. Has follow up scheduled for 9/19. Has Medicare so any meter covered through part B. Sent for meter with testing supplies and diagnosis codes included per AM protocol.

## 2017-08-24 NOTE — TELEPHONE ENCOUNTER
Pt called to follow up on request for new meter and test strips. Pt was unsure what would be covered by insurance. Please call.

## 2017-09-05 NOTE — TELEPHONE ENCOUNTER
Grace Tyler. Informed him of Dr. Kylie Steel message below. He verbalized his understanding of instructions and repeated back correctly to the RN.

## 2017-09-05 NOTE — TELEPHONE ENCOUNTER
Called Jayson. He states he is doing well. He does not have sugars readily available right now but fasting sugars have been around 180 and sugars in the afternoon/evening are 140-150.  He is currently taking lantus 22 units every morning and humalog about 2

## 2017-09-12 NOTE — PROGRESS NOTES
Patient presents to clinic today for dexcom placement. Consent form signed and process explained to patient including care of dexcom and need to check BG levels four times a day including 2 checks 1 hour post placement and then 4 times per day.  Instructed

## 2017-09-14 NOTE — TELEPHONE ENCOUNTER
Cheri Aranda is wearing Dexcom (vs IPro) for diagnostic purposed for 1 week to monitor BG. Pt states he is noticing trends in his sugar that he would not normally be aware of. Pt is asking if it is okay to correct?  For instance, he notices a rise in BG in the la

## 2017-09-19 NOTE — PROGRESS NOTES
Patient Visit - Diabetes        CHIEF COMPLAINT:  Patient presents with:  Diabetes  Hyperthyroidism       HISTORY OF PRESENT ILLNESS:   Francheska Whitaker is a 63year old male who is for a FU for DM and hyperthyroidism.     He was hospitalized recently and under total) into the skin daily. Disp: 1 vial Rfl: 0   Glucose-Vitamin C 4-0.006 g Oral Chew Tab Chew 4 tablets by mouth every 15 (fifteen) minutes as needed (hypoglycemia).  Disp: 1 tablet Rfl: 0   Insulin Aspart Pen 100 UNIT/ML Subcutaneous Solution Pen-inject Years of education:                 Number of children:               Social History Main Topics    Smoking status: Former Smoker                                                                Packs/day: 0.00      Years: 10.00          Types: normal monofilament sensation, normal pulses, no edema, no skin lesions      DATA:     Reviewed. ASSESSMENT AND PLAN:    1. DM:     Plan:  Discussed the pathogenesis, natural course of diabetes.  Patient understands the importance of glycemic control and Call in one week with BG.

## 2017-09-19 NOTE — PROGRESS NOTES
Patient Visit - Diabetes        CHIEF COMPLAINT:  Patient presents with:  Diabetes  Hyperthyroidism       HISTORY OF PRESENT ILLNESS:   Sally Garza is a 63year old male who is for a FU for DM and hyperthyroidism.     He was hospitalized recently and under Solution Pen-injector Inject 1-5 Units into the skin 3 (three) times daily with meals. Disp: 1 pen Rfl: 0   insulin detemir 100 UNIT/ML Subcutaneous Solution Pen-injector Inject 22 Units into the skin daily.  (Patient taking differently: Inject 40 Units int 10. 00          Types: Cigars       Quit date: 1/19/1995    Smokeless tobacco: Never Used                        Alcohol use:  No              Drug use: No            Other Topics            Concern  Caffeine Concern        Yes    Comment:coffee, 4 cups viridiana lesions      DATA:     Reviewed. ASSESSMENT AND PLAN:    1. DM:     Plan:  Discussed the pathogenesis, natural course of diabetes.  Patient understands the importance of glycemic control and the implications of uncontrolled diabetes including Diabetic ke Based on labs in June 2017, dose was decreased to one tablet a day  Will repeat TSH, FT4 and FT3 today. Will adjust dose accordingly. RTC in three months,   Call in one week with BG.

## 2017-10-06 NOTE — PROGRESS NOTES
Audra Fields  : 1955 was seen for Diabetic Education Initial Visit    Date: 10/6/2017   Start time: 1:30 PM End time: 2:30 PM      Assessment:  Wt 156 lb 14.4 oz   BMI 18.13 kg/m²       Glycohemoglobin (HgA1c) (%)   Date Value   2017 9.4 (H)

## 2017-10-20 PROBLEM — E10.649 TYPE 1 DIABETES MELLITUS WITH HYPOGLYCEMIA AND WITHOUT COMA (HCC): Status: ACTIVE | Noted: 2017-01-01

## 2017-10-20 NOTE — PROGRESS NOTES
Karel Foy 6/16/1955 attended for Intensive Management Follow Up:    Date: 10/20/2017 Start Time: 0485  End Time: 1150    Ht: 6'6\" Wt: Wt Readings from Last 6 Encounters:  10/20/17 : 155 lb 12.8 oz  10/06/17 : 156 lb 14.4 oz  09/19/17 : 145 lb  08/17/ Discussed importance of record keeping; provided intensive management food, BG and insulin logs. Also discussed importance of eating adequate carb to stay in normal metabolism, to provide sufficient kcals for weight gain and to maintain glycogen stores.

## 2017-10-24 NOTE — TELEPHONE ENCOUNTER
Current Outpatient Prescriptions:  Blood Glucose Monitoring Suppl ([facility] E) w/Device Does not apply Kit 4 each by Does not apply route daily.  Disp: 1 kit Rfl: 0   Glucose Blood (ONETOUCH VERIO) In Vitro Strip Check blood sugar 4 times

## 2017-10-26 NOTE — TELEPHONE ENCOUNTER
Spoke with [facility] order pharmacy. Patient states he usually takes Lantus not levemir Reviewed AM's last OV note and she did write patient to take Lantus 24 units increasing by 2 units daily to maximum of 28 units.  Relayed this information to pharma

## 2017-10-27 NOTE — TELEPHONE ENCOUNTER
LOV 9/19/2017. Instructions from LOV were Lantus 24 units increase by 2 units daily for maximum 28 units.

## 2017-10-27 NOTE — TELEPHONE ENCOUNTER
Spoke with Sari Naranjo. He saw Jeremiah  in the [facility]. They talked about diet for the first visit. He is now eating 50 g carbs with meals and sugars are more stable. He will go back to see her for a second visit to talk about pumps.

## 2017-10-30 NOTE — TELEPHONE ENCOUNTER
Called Jayson. He states he needs a lucero contour meter and supplies and generic pen needle order sent. Pending.

## 2017-10-31 NOTE — TELEPHONE ENCOUNTER
PA request received for onetouch verio test strips. Per TE from yesterday patient was prescribed alternative.

## 2017-11-06 NOTE — PROGRESS NOTES
The patient is a 82-year-old male who I know well from prior evaluation comes in now for follow-up. He had a severe right foot diabetic ulcer and required below the knee amputation and is doing much better at this juncture.   He still has labile diabetes b

## 2017-11-15 NOTE — TELEPHONE ENCOUNTER
----- Message from Lenore Valera MD sent at 11/8/2017 10:24 PM CST -----  RN, please let the patient know that his PSA is normal.

## 2017-11-21 NOTE — TELEPHONE ENCOUNTER
Patient is hypothyroid  He is on methimazole  Please ask him to stop  Repeat TSh and Ft4 and FT3 in 2 weeks. Thanks.

## 2017-11-21 NOTE — TELEPHONE ENCOUNTER
Spoke with Darin Junior and discussed to stop MMI per AM. Patient understands to stop MMI today and to get repeat labs in 2 weeks. Patient verbalized understanding. Labs ordered per AM written.

## 2017-11-21 NOTE — PROGRESS NOTES
Patient Visit - Diabetes        CHIEF COMPLAINT:  Patient presents with:  Diabetes  Hyperthyroidism       HISTORY OF PRESENT ILLNESS:   Roxanna Chase is a 63year old male who is for a FU for DM and hyperthyroidism.       DM HISTORY  Diagnosed: Around age 38 suppository Rfl: 0   docusate sodium 100 MG Oral Cap Take 100 mg by mouth 2 (two) times daily. Disp: 1 capsule Rfl: 0   magnesium hydroxide 400 MG/5ML Oral Suspension Take 30 mL by mouth daily as needed for constipation.  Disp: 1 mL Rfl: 0   PEG 3350 Oral P for: weight change, fever, fatigue, cold/heat intolerance  Eyes: Negative for:  Visual changes, proptosis, blurring, floaters, poor night vision, impaired color vision  ENT: Negative for:  dysphagia, neck swelling, dysphonia  Respiratory: Negative for:  dy been to the [facility]. He has maintained food logs and plans to go back and start the process for getting a pump. He states he is frustrated with the insurance. I asked him to start the process and we will be happy to assist him.   We have made many adjustment

## 2017-11-24 NOTE — TELEPHONE ENCOUNTER
Current Outpatient Prescriptions:  Blood Glucose Monitoring Suppl ([address] E) w/Device Does not apply Kit 1 each by Does not apply route daily.  Disp: 1 kit Rfl: 0     Request for One Touch Glucose meter

## 2017-12-06 NOTE — TELEPHONE ENCOUNTER
Spoke with Narciso Pallas. Informed him of AM's message below. Discussed symptoms of hypothyroidism. He will repeat labs in 2-3 weeks.

## 2017-12-06 NOTE — TELEPHONE ENCOUNTER
Please let the patient know that his labs now indicate a high TSH level. Thyroid hormones are normal.   He was hyperthyroid before and we recently stopped MMI. I suggest repeating TSH, FT4 and FT3 in 2-3 weeks.    If TSH remains elevated, I will recommend

## 2017-12-07 NOTE — TELEPHONE ENCOUNTER
PA for Lantus SoloStar insulin has not been approved. Called pt to make aware. Pt is currently taking Basaglar and reports meds are \"good\", no need to change in his opinion.

## 2017-12-08 NOTE — TELEPHONE ENCOUNTER
Inna/musa calling for mutual pt regarding rx: [address] E - indicates its discontinued. Calling to get the ok to change to rx:Contour Next Blood Glucose Meter. Pls call at:[phone],thanks.     Current Outpatient Prescriptions:   •

## 2017-12-08 NOTE — TELEPHONE ENCOUNTER
Please see below. Order pending for lucero contour meter. Patient has up to date supply orders for [employer] contour. LOV 11/21/17.

## 2018-01-01 ENCOUNTER — APPOINTMENT (OUTPATIENT)
Dept: GENERAL RADIOLOGY | Facility: HOSPITAL | Age: 63
DRG: 283 | End: 2018-01-01
Attending: CLINICAL NURSE SPECIALIST
Payer: MEDICAID

## 2018-01-01 ENCOUNTER — APPOINTMENT (OUTPATIENT)
Dept: GENERAL RADIOLOGY | Facility: HOSPITAL | Age: 63
DRG: 283 | End: 2018-01-01
Attending: INTERNAL MEDICINE
Payer: MEDICAID

## 2018-01-01 ENCOUNTER — TELEPHONE (OUTPATIENT)
Dept: ENDOCRINOLOGY CLINIC | Facility: CLINIC | Age: 63
End: 2018-01-01

## 2018-01-01 ENCOUNTER — APPOINTMENT (OUTPATIENT)
Dept: WOUND CARE | Facility: HOSPITAL | Age: 63
End: 2018-01-01
Attending: NURSE PRACTITIONER
Payer: MEDICAID

## 2018-01-01 ENCOUNTER — APPOINTMENT (OUTPATIENT)
Dept: GENERAL RADIOLOGY | Facility: HOSPITAL | Age: 63
DRG: 283 | End: 2018-01-01
Attending: EMERGENCY MEDICINE
Payer: MEDICAID

## 2018-01-01 ENCOUNTER — OFFICE VISIT (OUTPATIENT)
Dept: PODIATRY CLINIC | Facility: CLINIC | Age: 63
End: 2018-01-01

## 2018-01-01 ENCOUNTER — OFFICE VISIT (OUTPATIENT)
Dept: ENDOCRINOLOGY CLINIC | Facility: CLINIC | Age: 63
End: 2018-01-01

## 2018-01-01 ENCOUNTER — APPOINTMENT (OUTPATIENT)
Dept: CT IMAGING | Facility: HOSPITAL | Age: 63
DRG: 283 | End: 2018-01-01
Attending: EMERGENCY MEDICINE
Payer: MEDICAID

## 2018-01-01 ENCOUNTER — APPOINTMENT (OUTPATIENT)
Dept: CV DIAGNOSTICS | Facility: HOSPITAL | Age: 63
DRG: 283 | End: 2018-01-01
Attending: INTERNAL MEDICINE
Payer: MEDICAID

## 2018-01-01 ENCOUNTER — TELEPHONE (OUTPATIENT)
Dept: PODIATRY CLINIC | Facility: CLINIC | Age: 63
End: 2018-01-01

## 2018-01-01 ENCOUNTER — HOSPITAL ENCOUNTER (OUTPATIENT)
Dept: ENDOCRINOLOGY | Facility: HOSPITAL | Age: 63
Discharge: HOME | End: 2018-01-01
Attending: INTERNAL MEDICINE
Payer: MEDICAID

## 2018-01-01 ENCOUNTER — HOSPITAL ENCOUNTER (INPATIENT)
Facility: HOSPITAL | Age: 63
LOS: 4 days | DRG: 283 | End: 2018-01-01
Attending: EMERGENCY MEDICINE | Admitting: INTERNAL MEDICINE
Payer: MEDICAID

## 2018-01-01 ENCOUNTER — APPOINTMENT (OUTPATIENT)
Dept: LAB | Facility: HOSPITAL | Age: 63
End: 2018-01-01
Attending: INTERNAL MEDICINE
Payer: MEDICAID

## 2018-01-01 VITALS
BODY MASS INDEX: 18.51 KG/M2 | HEART RATE: 90 BPM | SYSTOLIC BLOOD PRESSURE: 138 MMHG | HEIGHT: 78 IN | DIASTOLIC BLOOD PRESSURE: 74 MMHG | WEIGHT: 160 LBS

## 2018-01-01 VITALS
SYSTOLIC BLOOD PRESSURE: 43 MMHG | WEIGHT: 168 LBS | HEIGHT: 71 IN | DIASTOLIC BLOOD PRESSURE: 27 MMHG | OXYGEN SATURATION: 100 % | BODY MASS INDEX: 23.52 KG/M2 | TEMPERATURE: 98 F | RESPIRATION RATE: 14 BRPM | HEART RATE: 37 BPM

## 2018-01-01 VITALS — BODY MASS INDEX: 18.95 KG/M2 | HEIGHT: 78 IN | WEIGHT: 163.81 LBS

## 2018-01-01 DIAGNOSIS — E03.9 HYPOTHYROIDISM, UNSPECIFIED TYPE: ICD-10-CM

## 2018-01-01 DIAGNOSIS — L97.821 VENOUS STASIS ULCER OF OTHER PART OF LEFT LOWER LEG LIMITED TO BREAKDOWN OF SKIN WITH VARICOSE VEINS (HCC): ICD-10-CM

## 2018-01-01 DIAGNOSIS — E10.649 TYPE 1 DIABETES MELLITUS WITH HYPOGLYCEMIA AND WITHOUT COMA (HCC): ICD-10-CM

## 2018-01-01 DIAGNOSIS — E10.649 TYPE 1 DIABETES MELLITUS WITH HYPOGLYCEMIA AND WITHOUT COMA (HCC): Primary | ICD-10-CM

## 2018-01-01 DIAGNOSIS — E11.65 UNCONTROLLED TYPE 2 DIABETES MELLITUS WITH HYPERGLYCEMIA, WITH LONG-TERM CURRENT USE OF INSULIN (HCC): Primary | ICD-10-CM

## 2018-01-01 DIAGNOSIS — L97.922 ULCER OF LEFT LOWER EXTREMITY WITH FAT LAYER EXPOSED (HCC): ICD-10-CM

## 2018-01-01 DIAGNOSIS — I83.028 VENOUS STASIS ULCER OF OTHER PART OF LEFT LOWER LEG LIMITED TO BREAKDOWN OF SKIN WITH VARICOSE VEINS (HCC): ICD-10-CM

## 2018-01-01 DIAGNOSIS — R73.9 HYPERGLYCEMIA: ICD-10-CM

## 2018-01-01 DIAGNOSIS — I83.028 VENOUS STASIS ULCER OF OTHER PART OF LEFT LOWER LEG LIMITED TO BREAKDOWN OF SKIN WITH VARICOSE VEINS (HCC): Primary | ICD-10-CM

## 2018-01-01 DIAGNOSIS — Z79.4 UNCONTROLLED TYPE 2 DIABETES MELLITUS WITH HYPERGLYCEMIA, WITH LONG-TERM CURRENT USE OF INSULIN (HCC): Primary | ICD-10-CM

## 2018-01-01 DIAGNOSIS — R77.8 ELEVATED TROPONIN: ICD-10-CM

## 2018-01-01 DIAGNOSIS — L97.309 DIABETIC ULCER OF ANKLE (HCC): Primary | ICD-10-CM

## 2018-01-01 DIAGNOSIS — E03.9 HYPOTHYROIDISM, UNSPECIFIED TYPE: Primary | ICD-10-CM

## 2018-01-01 DIAGNOSIS — I46.9 CARDIAC ARREST (HCC): Primary | ICD-10-CM

## 2018-01-01 DIAGNOSIS — E87.2 LACTIC ACIDOSIS: ICD-10-CM

## 2018-01-01 DIAGNOSIS — N30.01 ACUTE CYSTITIS WITH HEMATURIA: ICD-10-CM

## 2018-01-01 DIAGNOSIS — J93.9 PNEUMOTHORAX, UNSPECIFIED TYPE: ICD-10-CM

## 2018-01-01 DIAGNOSIS — E11.622 DIABETIC ULCER OF ANKLE (HCC): Primary | ICD-10-CM

## 2018-01-01 DIAGNOSIS — L97.821 VENOUS STASIS ULCER OF OTHER PART OF LEFT LOWER LEG LIMITED TO BREAKDOWN OF SKIN WITH VARICOSE VEINS (HCC): Primary | ICD-10-CM

## 2018-01-01 DIAGNOSIS — E87.6 HYPOKALEMIA: ICD-10-CM

## 2018-01-01 LAB
CARTRIDGE LOT#: ABNORMAL NUMERIC
GLUCOSE BLOOD: 444
HEMOGLOBIN A1C: 8.6 % (ref 4.3–5.6)
T3FREE SERPL-MCNC: 2.77 PG/ML (ref 2.53–4.29)
T4 FREE SERPL-MCNC: 0.79 NG/DL (ref 0.58–1.64)
TEST STRIP LOT #: NORMAL NUMERIC
TSH SERPL-ACNC: 22.93 UIU/ML (ref 0.45–5.33)

## 2018-01-01 PROCEDURE — 29581 APPL MULTLAYER CMPRN SYS LEG: CPT

## 2018-01-01 PROCEDURE — 99291 CRITICAL CARE FIRST HOUR: CPT | Performed by: INTERNAL MEDICINE

## 2018-01-01 PROCEDURE — 99212 OFFICE O/P EST SF 10 MIN: CPT | Performed by: INTERNAL MEDICINE

## 2018-01-01 PROCEDURE — 99254 IP/OBS CNSLTJ NEW/EST MOD 60: CPT | Performed by: OTHER

## 2018-01-01 PROCEDURE — 71260 CT THORAX DX C+: CPT | Performed by: EMERGENCY MEDICINE

## 2018-01-01 PROCEDURE — 99203 OFFICE O/P NEW LOW 30 MIN: CPT | Performed by: PODIATRIST

## 2018-01-01 PROCEDURE — 84443 ASSAY THYROID STIM HORMONE: CPT

## 2018-01-01 PROCEDURE — 93306 TTE W/DOPPLER COMPLETE: CPT | Performed by: INTERNAL MEDICINE

## 2018-01-01 PROCEDURE — 99214 OFFICE O/P EST MOD 30 MIN: CPT | Performed by: INTERNAL MEDICINE

## 2018-01-01 PROCEDURE — 99233 SBSQ HOSP IP/OBS HIGH 50: CPT | Performed by: INTERNAL MEDICINE

## 2018-01-01 PROCEDURE — 5A1945Z RESPIRATORY VENTILATION, 24-96 CONSECUTIVE HOURS: ICD-10-PCS | Performed by: INTERNAL MEDICINE

## 2018-01-01 PROCEDURE — 99232 SBSQ HOSP IP/OBS MODERATE 35: CPT | Performed by: INTERNAL MEDICINE

## 2018-01-01 PROCEDURE — 97162 PT EVAL MOD COMPLEX 30 MIN: CPT

## 2018-01-01 PROCEDURE — 99232 SBSQ HOSP IP/OBS MODERATE 35: CPT | Performed by: OTHER

## 2018-01-01 PROCEDURE — 99212 OFFICE O/P EST SF 10 MIN: CPT | Performed by: PODIATRIST

## 2018-01-01 PROCEDURE — 70450 CT HEAD/BRAIN W/O DYE: CPT | Performed by: EMERGENCY MEDICINE

## 2018-01-01 PROCEDURE — 71045 X-RAY EXAM CHEST 1 VIEW: CPT | Performed by: EMERGENCY MEDICINE

## 2018-01-01 PROCEDURE — 74177 CT ABD & PELVIS W/CONTRAST: CPT | Performed by: EMERGENCY MEDICINE

## 2018-01-01 PROCEDURE — 36416 COLLJ CAPILLARY BLOOD SPEC: CPT | Performed by: INTERNAL MEDICINE

## 2018-01-01 PROCEDURE — 99222 1ST HOSP IP/OBS MODERATE 55: CPT | Performed by: INTERNAL MEDICINE

## 2018-01-01 PROCEDURE — 99231 SBSQ HOSP IP/OBS SF/LOW 25: CPT | Performed by: OTHER

## 2018-01-01 PROCEDURE — 84481 FREE ASSAY (FT-3): CPT

## 2018-01-01 PROCEDURE — 71045 X-RAY EXAM CHEST 1 VIEW: CPT | Performed by: INTERNAL MEDICINE

## 2018-01-01 PROCEDURE — 95822 EEG COMA OR SLEEP ONLY: CPT | Performed by: OTHER

## 2018-01-01 PROCEDURE — 83036 HEMOGLOBIN GLYCOSYLATED A1C: CPT | Performed by: INTERNAL MEDICINE

## 2018-01-01 PROCEDURE — 82962 GLUCOSE BLOOD TEST: CPT | Performed by: INTERNAL MEDICINE

## 2018-01-01 PROCEDURE — 0BH17EZ INSERTION OF ENDOTRACHEAL AIRWAY INTO TRACHEA, VIA NATURAL OR ARTIFICIAL OPENING: ICD-10-PCS | Performed by: INTERNAL MEDICINE

## 2018-01-01 PROCEDURE — 99212 OFFICE O/P EST SF 10 MIN: CPT

## 2018-01-01 PROCEDURE — 95816 EEG AWAKE AND DROWSY: CPT | Performed by: OTHER

## 2018-01-01 PROCEDURE — 84439 ASSAY OF FREE THYROXINE: CPT

## 2018-01-01 PROCEDURE — 36415 COLL VENOUS BLD VENIPUNCTURE: CPT

## 2018-01-01 RX ORDER — HEPARIN SODIUM 1000 [USP'U]/ML
30 INJECTION, SOLUTION INTRAVENOUS; SUBCUTANEOUS ONCE
Status: COMPLETED | OUTPATIENT
Start: 2018-01-01 | End: 2018-01-01

## 2018-01-01 RX ORDER — SODIUM CHLORIDE, SODIUM LACTATE, POTASSIUM CHLORIDE, CALCIUM CHLORIDE 600; 310; 30; 20 MG/100ML; MG/100ML; MG/100ML; MG/100ML
INJECTION, SOLUTION INTRAVENOUS CONTINUOUS
Status: DISCONTINUED | OUTPATIENT
Start: 2018-01-01 | End: 2018-01-01 | Stop reason: DRUGHIGH

## 2018-01-01 RX ORDER — MAGNESIUM SULFATE HEPTAHYDRATE 40 MG/ML
2 INJECTION, SOLUTION INTRAVENOUS ONCE
Status: COMPLETED | OUTPATIENT
Start: 2018-01-01 | End: 2018-01-01

## 2018-01-01 RX ORDER — DEXTROSE MONOHYDRATE 25 G/50ML
INJECTION, SOLUTION INTRAVENOUS
Status: COMPLETED
Start: 2018-01-01 | End: 2018-01-01

## 2018-01-01 RX ORDER — MIDAZOLAM HYDROCHLORIDE 1 MG/ML
INJECTION INTRAMUSCULAR; INTRAVENOUS
Status: COMPLETED
Start: 2018-01-01 | End: 2018-01-01

## 2018-01-01 RX ORDER — HEPARIN SODIUM AND DEXTROSE 10000; 5 [USP'U]/100ML; G/100ML
INJECTION INTRAVENOUS CONTINUOUS
Status: DISCONTINUED | OUTPATIENT
Start: 2018-01-01 | End: 2018-01-01

## 2018-01-01 RX ORDER — 0.9 % SODIUM CHLORIDE 0.9 %
VIAL (ML) INJECTION
Status: DISPENSED
Start: 2018-01-01 | End: 2018-01-01

## 2018-01-01 RX ORDER — CASTOR OIL AND BALSAM, PERU 788; 87 MG/G; MG/G
OINTMENT TOPICAL 2 TIMES DAILY PRN
Status: DISCONTINUED | OUTPATIENT
Start: 2018-01-01 | End: 2018-01-01

## 2018-01-01 RX ORDER — LEVOTHYROXINE SODIUM 0.07 MG/1
75 TABLET ORAL
Qty: 30 TABLET | Refills: 2 | Status: SHIPPED | OUTPATIENT
Start: 2018-01-01

## 2018-01-01 RX ORDER — DEXTROSE MONOHYDRATE 50 MG/ML
INJECTION, SOLUTION INTRAVENOUS CONTINUOUS
Status: DISCONTINUED | OUTPATIENT
Start: 2018-01-01 | End: 2018-01-01

## 2018-01-01 RX ORDER — CALCIUM CHLORIDE 100 MG/ML
INJECTION INTRAVENOUS; INTRAVENTRICULAR
Status: COMPLETED | OUTPATIENT
Start: 2018-01-01 | End: 2018-01-01

## 2018-01-01 RX ORDER — HEPARIN SODIUM 1000 [USP'U]/ML
5000 INJECTION, SOLUTION INTRAVENOUS; SUBCUTANEOUS ONCE
Status: COMPLETED | OUTPATIENT
Start: 2018-01-01 | End: 2018-01-01

## 2018-01-01 RX ORDER — CLINDAMYCIN HYDROCHLORIDE 300 MG/1
300 CAPSULE ORAL 3 TIMES DAILY
Qty: 30 CAPSULE | Refills: 0 | Status: SHIPPED | OUTPATIENT
Start: 2018-01-01 | End: 2018-01-01

## 2018-01-01 RX ORDER — SODIUM CHLORIDE 9 MG/ML
INJECTION, SOLUTION INTRAVENOUS
Status: COMPLETED
Start: 2018-01-01 | End: 2018-01-01

## 2018-01-01 RX ORDER — DEXTROSE MONOHYDRATE 25 G/50ML
INJECTION, SOLUTION INTRAVENOUS
Status: COMPLETED | OUTPATIENT
Start: 2018-01-01 | End: 2018-01-01

## 2018-01-01 RX ORDER — 0.9 % SODIUM CHLORIDE 0.9 %
VIAL (ML) INJECTION
Status: COMPLETED
Start: 2018-01-01 | End: 2018-01-01

## 2018-01-01 RX ORDER — MINERAL OIL AND PETROLATUM 150; 830 MG/G; MG/G
OINTMENT OPHTHALMIC 2 TIMES DAILY
Status: DISCONTINUED | OUTPATIENT
Start: 2018-01-01 | End: 2018-01-01

## 2018-01-01 RX ORDER — CIPROFLOXACIN 2 MG/ML
400 INJECTION, SOLUTION INTRAVENOUS ONCE
Status: COMPLETED | OUTPATIENT
Start: 2018-01-01 | End: 2018-01-01

## 2018-01-01 RX ORDER — MORPHINE SULFATE 4 MG/ML
INJECTION, SOLUTION INTRAMUSCULAR; INTRAVENOUS
Status: COMPLETED
Start: 2018-01-01 | End: 2018-01-01

## 2018-01-01 RX ORDER — MORPHINE SULFATE IN 0.9 % NACL 1 MG/ML
4 PLASTIC BAG, INJECTION (ML) INTRAVENOUS CONTINUOUS
Status: DISCONTINUED | OUTPATIENT
Start: 2018-01-01 | End: 2018-01-01

## 2018-01-01 RX ORDER — DEXTROSE MONOHYDRATE 25 G/50ML
50 INJECTION, SOLUTION INTRAVENOUS AS NEEDED
Status: DISCONTINUED | OUTPATIENT
Start: 2018-01-01 | End: 2018-01-01

## 2018-01-01 RX ORDER — MIDAZOLAM HYDROCHLORIDE 1 MG/ML
INJECTION INTRAMUSCULAR; INTRAVENOUS
Status: DISPENSED
Start: 2018-01-01 | End: 2018-01-01

## 2018-01-01 RX ORDER — MIDAZOLAM HYDROCHLORIDE 1 MG/ML
2 INJECTION INTRAMUSCULAR; INTRAVENOUS ONCE
Status: DISCONTINUED | OUTPATIENT
Start: 2018-01-01 | End: 2018-01-01 | Stop reason: ALTCHOICE

## 2018-01-01 RX ORDER — POTASSIUM CHLORIDE 29.8 MG/ML
40 INJECTION INTRAVENOUS ONCE
Status: COMPLETED | OUTPATIENT
Start: 2018-01-01 | End: 2018-01-01

## 2018-01-01 RX ORDER — HEPARIN SODIUM AND DEXTROSE 10000; 5 [USP'U]/100ML; G/100ML
12 INJECTION INTRAVENOUS ONCE
Status: DISCONTINUED | OUTPATIENT
Start: 2018-01-01 | End: 2018-01-01

## 2018-01-01 RX ORDER — HEPARIN SODIUM AND DEXTROSE 10000; 5 [USP'U]/100ML; G/100ML
18 INJECTION INTRAVENOUS ONCE
Status: DISCONTINUED | OUTPATIENT
Start: 2018-01-01 | End: 2018-01-01

## 2018-01-01 RX ORDER — HEPARIN SODIUM 1000 [USP'U]/ML
60 INJECTION, SOLUTION INTRAVENOUS; SUBCUTANEOUS ONCE
Status: DISCONTINUED | OUTPATIENT
Start: 2018-01-01 | End: 2018-01-01

## 2018-01-01 RX ORDER — HEPARIN SODIUM 1000 [USP'U]/ML
80 INJECTION, SOLUTION INTRAVENOUS; SUBCUTANEOUS ONCE
Status: DISCONTINUED | OUTPATIENT
Start: 2018-01-01 | End: 2018-01-01 | Stop reason: ALTCHOICE

## 2018-01-01 RX ORDER — LEVOTHYROXINE SODIUM 0.05 MG/1
50 TABLET ORAL
Qty: 30 TABLET | Refills: 2 | Status: SHIPPED | OUTPATIENT
Start: 2018-01-01 | End: 2018-01-01

## 2018-01-01 RX ORDER — LEVOFLOXACIN 5 MG/ML
750 INJECTION, SOLUTION INTRAVENOUS
Status: DISCONTINUED | OUTPATIENT
Start: 2018-01-01 | End: 2018-01-01

## 2018-01-01 RX ORDER — MIDAZOLAM HYDROCHLORIDE 1 MG/ML
2 INJECTION INTRAMUSCULAR; INTRAVENOUS
Status: DISCONTINUED | OUTPATIENT
Start: 2018-01-01 | End: 2018-01-01

## 2018-01-01 RX ORDER — HEPARIN SODIUM 10000 [USP'U]/100ML
18 INJECTION, SOLUTION INTRAVENOUS CONTINUOUS
Status: DISCONTINUED | OUTPATIENT
Start: 2018-01-01 | End: 2018-01-01

## 2018-01-01 RX ORDER — MORPHINE SULFATE 2 MG/ML
2 INJECTION, SOLUTION INTRAMUSCULAR; INTRAVENOUS
Status: DISCONTINUED | OUTPATIENT
Start: 2018-01-01 | End: 2018-01-01

## 2018-01-03 NOTE — TELEPHONE ENCOUNTER
Patient returned call. Informed him of AM\"s instructions below. He understands to take medication 30 min before breakfast and  from vitamins or supplements by 4 hours. He states that fasting sugars are 120-140. Evening sugars 100-120.  Over th

## 2018-02-16 NOTE — TELEPHONE ENCOUNTER
Jameel from [facility] transportation services is requesting a phone call to know if pt visit to see dr Dea Chester on 02/20/2018 is a urgency.  States pt failed to give proper time frame for transportation and they need confirmation from clinical staff please call t

## 2018-02-16 NOTE — TELEPHONE ENCOUNTER
Please see below. Is appt on 2/20 urgent? Can call the transport service to let them know of the appt time.

## 2018-02-20 NOTE — TELEPHONE ENCOUNTER
Spoke with Todd Neal. Because of open wound we got patient scheduled with podiatry for visit on Thursday. Per patient in order for transportation to be available for him we need to confirm that the visit need is urgent.  Contacted [employer] at [phone] and tessie

## 2018-02-20 NOTE — PROGRESS NOTES
Patient Visit - Diabetes        CHIEF COMPLAINT:  Patient presents with:  Diabetes  Hyperthyroidism/hypothyroidism  LLE ulcer       HISTORY OF PRESENT ILLNESS:   Charles Schwab is a 63year old male who is for a FU for DM and hyperthyroidism.       DM HISTORY 0   bisacodyl 10 MG Rectal Suppos Place 1 suppository (10 mg total) rectally daily as needed. Disp: 1 suppository Rfl: 0   docusate sodium 100 MG Oral Cap Take 100 mg by mouth 2 (two) times daily.  Disp: 1 capsule Rfl: 0   magnesium hydroxide 400 MG/5ML Ora reviewed. No pertinent family history.     ASSESSMENTS:        REVIEW OF SYSTEMS:  Constitutional: Negative for: weight change, fever, fatigue, cold/heat intolerance  Eyes: Negative for:  Visual changes, proptosis, blurring, floaters, poor night vision, imp adjustments and even though sugars are better, he has glycemic variability and hypoglycemia. Given extreme BG variability and hypoglycemia with unawareness, I recommend an insulin pump and CGM.  I feel that the [address] will be an ideal pump for hi

## 2018-02-20 NOTE — TELEPHONE ENCOUNTER
Transport needed to verify if patient needed to be seen today in order to establish transportation. Called and verify urgency of appt. They will hopefully be able to arrange transport for patient.

## 2018-02-21 NOTE — TELEPHONE ENCOUNTER
Please let the patient know that thyroid labs indicate the need for increase in dsoe  He is on LT4 50 mcg daily, should go up to 75 mcg daily  Repeat TSH and FT4 in 6-8 weeks  Thanks

## 2018-02-22 NOTE — PROGRESS NOTES
HPI:    Patient ID: Jolie Stafford is a 63year old male. HPI  This 44-year-old diabetic presents as a new patient to me. He has been a diabetic foot since 2001. He reports that his most recent A1c was 8.6 and his fasting blood sugar today was 180.   A each by Does not apply route daily.  Use pen needles to inject insulin 4 times per day Disp: 400 each Rfl: 0   Glucose Blood (SHANNA CONTOUR NEXT TEST) In Vitro Strip Use test strips to check blood sugar up to 5 times per day Disp: 500 strip Rfl: 0   SHANNA M circulation I instructed this patient that I would send him for an evaluation and care of this wound. After a complete discussion he was referred to Dr.Najjar for considerations and appropriate care.   I believe at present that he is being treated correctl

## 2018-02-23 NOTE — TELEPHONE ENCOUNTER
Called Dr. Swati Joseph office and spoke to NNAMDI. Informed her that, per SCR, when pt calls to please try to schedule him within 1 week.

## 2018-02-23 NOTE — TELEPHONE ENCOUNTER
Spoke to mother of pt and states pt is not home right now. LMTCB with mother of pt. -- Ortho nursing triage -- when pt calls back please instruct him, per SCR, to call Dr. Negar buckley to be seen within one week.  Referral to Dr. Negar Wills office was

## 2018-02-26 NOTE — TELEPHONE ENCOUNTER
Called pt and informed him of wound clinic appt/info. Advised her call his ins asap to see where he can see a vascular surgeon. He verbalized understanding.

## 2018-02-26 NOTE — TELEPHONE ENCOUNTER
Called wound clinic and s/w Lizeth Adkins- made appt for pt on 3/5/18 @ 10:45am. She will call pt sooner if anything opens up. SCR- is this ok? I can also tell pt to call his ins to find a vascular sx in his network to see .

## 2018-02-26 NOTE — TELEPHONE ENCOUNTER
He could be treated in the wound clinic, but I feel best if marie has a vascular workup as well. Sorry, I dont know about insurance issues.  scr

## 2018-03-05 NOTE — PROGRESS NOTES
Subjective    Chief Complaint  This information was obtained from the patient  The patient is new to the [address] [facility] here for an initial visit for the evaluation and management of non-healing wound(s).  L lat leg venous ulcer    Allergies  penicillin    HP insulin glargine 100 unit/mL subcutaneous solution subcutaneous solution subcutaneous once daily 28 U  clindamycin 300 mg capsule oral capsule oral three times daily  levothyroxine 75 mcg tablet oral tablet oral once daily before breakfast        Objective Pt is a 63 y.o. man who presents with a L lat leg wound that appears to be mixed arteriovenous in nature. He does have diabetes, and states he is a \"brittle diabetic\" who has a hard time controlling sugars despite watching his sugars and diet.     Pt butler Signature(s): Date(s):  Treatment Notes Summary  Wound #1 (Left, Lateral Lower Leg)  . Wound Treatment Note  Cleansed wound and periwound with non-cytotoxic agent.  using Wound Cleanser Spray (1)  Applied topical agent to base of wound bed. using Medihoney g

## 2018-03-08 NOTE — PROGRESS NOTES
Subjective    Chief Complaint  This information was obtained from the patient  The patient is new to the [address] [facility],[address] here for an initial visit for the evaluation and management of non-healing wound(s). L lat leg venous ulcer.    03/08/2018: \"The only ques mellitus with hypoglycemia without coma        Plan    Cont 1-2x/week. Once wound progress is stable reduce to 1x/week. Follow up with pt next week to see if he initiated a referral to vascular/vascular testing.             Entered By: Enieda Pandya on 0

## 2018-03-11 NOTE — TELEPHONE ENCOUNTER
----- Message from [place] [facility] sent at 2/27/2018 10:02 AM CST -----  Hi Dr. Moody Hooker,   Unfortunately I am booked through the 20th plus I will be away from the 8th through the 19th.   If you want him to have more information on the 670G, he may be better se

## 2018-03-11 NOTE — TELEPHONE ENCOUNTER
Please see below message from Morgan Taveras. Please call the patient and let him know that steps are indicated in her reply. Thanks.

## 2018-03-12 NOTE — PROGRESS NOTES
Subjective    Chief Complaint  This information was obtained from the patient  The patient is new to the [address] [facility] here for an initial visit for the evaluation and management of non-healing wound(s). L lat leg venous ulcer.    3/12/18: No complaints for Pt's L lat leg wound has reduced in area by >50% in one week. He is on a nice healing trajectory at this time, with epithelialization noted at all wound margins.   Will cont with current plan of care and reduce to 1x/week as wound is improving and drainage Achieve maturation of the scar tissue to allow successful transition to compression garments without reulceration due to shear and friction  Achieve wound closure to promote return to normal functional activities  Notes  Offloading to 5th metatarsal base k

## 2018-03-12 NOTE — TELEPHONE ENCOUNTER
Patient returned call. Has appt with Lory Lefort in [facility] next week and he will keep that appt.

## 2018-03-12 NOTE — TELEPHONE ENCOUNTER
Per chart ok to leave detailed message. Called Jayson. His wife answered and he is not available. She is unable to take a message at this time. She will have him call back.

## 2018-03-19 NOTE — PROGRESS NOTES
Subjective    Chief Complaint  This information was obtained from the patient  The patient is new to the [address] [facility] here for an initial visit for the evaluation and management of non-healing wound(s). L lat leg venous ulcer.    3/19/18: \"I have taken you Measured pt's leg for measurements for compression stockings and instructed to purchase a light 15-20mmHg stocking.   Anticipate full wound closure by next week at the current rate- will rewrap pt one additional week post closure in order to allow the new s Offloading to 5th metatarsal base kept in place

## 2018-03-21 NOTE — PROGRESS NOTES
Osmel Baugh 6/16/1955 attended for Intensive Management:    Date: 3/21/2018 Start Time: 1400 End Time: 3818    Ht: 163.8# Wt: Wt Readings from Last 6 Encounters:  03/21/18 : 163 lb 12.8 oz  02/20/18 : 160 lb  11/21/17 : 160 lb  11/06/17 : 155 lb 12.8 oz Use insulin as prescribed. Call Dr. Tim Morocho or the [facility] if you are have two lows the same time of day within a week. Start paperwork for the pump when you decide on a pump. Pt verbalized understanding and has no further questions.   He

## 2018-03-26 NOTE — PROGRESS NOTES
Subjective    Chief Complaint  This information was obtained from the patient  The patient is new to the [address] [facility] here for an initial visit for the evaluation and management of non-healing wound(s). L lat leg venous ulcer.    3/26/18: No concerns for to Pt's L lat leg wound is now resolved after 3 weeks of advanced dressings and compression therapy. He has been very compliant in all recommendations, however blood sugars do continue to be labile.     Rewrapped fragile new epithelium in order to prevent any Written PT Goals - Long Term (8-12 weeks)  Achieve maturation of the scar tissue to allow successful transition to compression garments without reulceration due to shear and friction  Achieve wound closure to promote return to normal functional activities

## 2018-04-04 NOTE — TELEPHONE ENCOUNTER
Pt states per [facility] approval is needed from AM to obtain insulin pump.  Thank you

## 2018-04-06 NOTE — TELEPHONE ENCOUNTER
Spoke with Lily Fletcher in the [facility]. She will contact Doreen Laguna (who saw the patient) and see what orders are needed from AM. Will await call.

## 2018-04-11 NOTE — TELEPHONE ENCOUNTER
Called the [facility] [address] for update. Spoke with Marcellus Cheung. She states Scarlett Acharya is on vacation and she will update us once Scarlett Acharya is back and informs her what is needed. Called and updated the patient.

## 2018-04-11 NOTE — PROGRESS NOTES
Subjective    Chief Complaint  This information was obtained from the patient  The patient is new to the [address] [facility] here for an initial visit for the evaluation and management of non-healing wound(s). L lat leg venous ulcer.    4/11/18: No concerns for to Long Term Physical Therapy Wound Goals (8-12 weeks)  Achieve maturation of the scar tissue to allow successful transition to compression garments without reulceration due to shear and friction- MET  Achieve wound closure to promote return to normal functio

## 2018-04-17 NOTE — TELEPHONE ENCOUNTER
Called the [facility] again. They state that Raffy Aparicio is back and RN to route message to her. Raffy Aparicio please see below. Requesting clarification on what is needed.

## 2018-04-18 NOTE — TELEPHONE ENCOUNTER
Kym Nelson calling back - states she does not get pumps for pts - pt needs to talk to Briana Jj or Varun Ho  - she is not sure what orders need to be written - pls call Varun Ho tomorrow at ext [phone]

## 2018-04-19 PROBLEM — I46.9 CARDIAC ARREST (HCC): Status: ACTIVE | Noted: 2018-01-01

## 2018-04-19 NOTE — TELEPHONE ENCOUNTER
Called the patient. No answer, no VM option. These are the next steps:    Called [employer] to start insurance check on 670G pump. Phone number to call is [phone].     Schedule follow up appt with Elizabeth Gallegos and bring sugars, food log, carbs counted, and medic

## 2018-04-20 PROBLEM — N17.9 AKI (ACUTE KIDNEY INJURY) (HCC): Status: ACTIVE | Noted: 2017-01-01

## 2018-04-20 PROBLEM — N30.01 ACUTE CYSTITIS WITH HEMATURIA: Status: ACTIVE | Noted: 2018-01-01

## 2018-04-20 PROBLEM — J93.9 PNEUMOTHORAX, UNSPECIFIED TYPE: Status: ACTIVE | Noted: 2018-01-01

## 2018-04-20 PROBLEM — E87.2 LACTIC ACIDOSIS: Status: ACTIVE | Noted: 2018-01-01

## 2018-04-20 PROBLEM — E87.20 LACTIC ACIDOSIS: Status: ACTIVE | Noted: 2018-01-01

## 2018-04-20 PROBLEM — R77.8 ELEVATED TROPONIN: Status: ACTIVE | Noted: 2018-01-01

## 2018-04-20 PROBLEM — E87.6 HYPOKALEMIA: Status: ACTIVE | Noted: 2018-01-01

## 2018-04-20 NOTE — ED PROVIDER NOTES
Patient Seen in: [facility] Emergency Department    History   Patient presents with:  Cardiac Arrest (cardiovascular)    Stated Complaint: full arrest    HPI    History is provided by EMS.     62-year-old male with history of diabetes, thyroid diso Neck: No thyromegaly present. Cardiovascular:   pulseless   Pulmonary/Chest:   Mechanical breath sounds b/l   Abdominal: Soft.    Musculoskeletal:   s/p R BKA, LLE IO in place   Neurological:   No gag reflex, does not withdraw extremities to painful sti secured in place with silk sutures and sterile dressing applied. Pleural Vac placed to suction. No complications were noted. Post procedure CXR shows improvement of pneumothorax.     PROCEDURE:  Central Line Placement  :  Maria Teresa Olivas CULTURE REFLEX   Collection Time: 04/19/18  9:19 PM   Result Value Ref Range   Urine Color Yellow Yellow   Clarity Urine Cloudy (A) Clear   Spec Gravity 1.027 1.002 - 1.035   pH Urine 5.0 5.0 - 8.0   Protein Urine 100  (A) Negative mg/dL   Glucose Urine 50 7.7 K/UL   Lymphocyte Absolute 3.0 1.0 - 4.0 K/UL   Monocyte Absolute 0.2 0.0 - 1.0 K/UL   Eosinophil Absolute 0.2 0.0 - 0.7 K/UL   Basophil Absolute 0.1 0.0 - 0.2 K/UL   Macrocytosis 1+    Ovalocytes 1+    -LIPID PANEL   Collection Time: 04/19/18  9:19 PM initiated  - see nursing report for meds given  - ROSC was achieved although pt was in unstable afib with RVR  - pt was cardioverted with 200J with resolve  - amio gtt started  - I ordered and personally reviewed the labs and imaging and found pneumothorax arrest, PE, pneumothorax, tension pneumothorax, hypothermia, hyperkalemia.     Critical Care Time:  Total critical care time for this patient was 84 minutes exclusive of separately billable procedures, but in obtaining history, performing a physical exam, b

## 2018-04-20 NOTE — CODE DOCUMENTATION
patient found unresponsive and pulseless outside a post office. Unknown downtime. 2 epi given PTA. Pulse regained upon arrival. Intubated PTA, IP to left tib.

## 2018-04-20 NOTE — PROGRESS NOTES
04/20/18 1551   Clinical Encounter Type   Visited With Family   Routine Visit Follow-up   Continue Visiting Yes   Crisis Visit Critical care   Referral From Family   Referral To    Rastafari Encounters   Rastafari Needs Prayer   Patient Navya Keller

## 2018-04-20 NOTE — PROGRESS NOTES
Melany    Patient admitted from ER, presented as out of hospital cardiac arrest-resuscitated with PH of 6.83.  Currently intubated with r sided chest tube. Also on insulin drip for glucose >500.   He is currently intubated, some myoclonic jerking noted

## 2018-04-20 NOTE — PAYOR COMM NOTE
--------------  ADMISSION REVIEW     Payor: Dontae Mcneil #:  [id number]  Authorization Number: [id number]    Admit date: 4/20/18  Admit time: [address]       Admitting Physician: Majo Paz MD  Attending Physician: vital signs reviewed. All other systems reviewed and negative except as noted above.     Physical Exam   ED Triage Vitals  BP: (!) 0/0 [04/19/18 2053]  Pulse: (!) 0 [04/19/18 2053]  Resp: 16 [04/19/18 2053]  Temp: (!) 96.7 °F (35.9 °C) [04/19/18 2101] discussion the patient / caregiver agree with this chosen diagnostic and treatment plan and emergency consent was obtained. Timeout was performed and the correct patient, site, and location was confirmed prior to initiation.   Patient had continuous ca Range   Hold Blue Auto Resulted    -RAINBOW DRAW LAVENDER   Collection Time: 04/19/18  9:19 PM   Result Value Ref Range   Hold Lavender Auto Resulted    -RAINBOW DRAW DARK GREEN   Collection Time: 04/19/18  9:19 PM   Result Value Ref Range   Hold Lt Juanita Cheeks 0.07 (HH) <=0.03 ng/mL   -LACTIC ACID, PLASMA   Collection Time: 04/19/18  9:19 PM   Result Value Ref Range   Lactic Acid 17.7 (H) 0.5 - 2.2 mmol/L   -CBC W/ DIFFERENTIAL   Collection Time: 04/19/18  9:19 PM   Result Value Ref Range   WBC 7.0 4.0 - 11.0 K/ approximately 25% of right hemithorax volume. No tracheal-mediastinal shift. There is atelectasis of the right upper lobe. 2.  Satisfactory position of endotracheal tube. Findings discussed with Dr. Pretty Arriola on 4/19/18 at 9:37 p.m.    Dictated by (CST): [facility]; Acute osteomyelitis of foot ([facility] [address] Malnutrition ([facility] Hyperglycemia; Hyponatremia; Acute kidney injury ([facility] Azotemia; Metabolic acidosis; Type 1 diabetes mellitus with ketoacidosis without coma ([facility]  Type 1 diabetes mellitu started on CPR with [facility]. However in ER pt had 2 more episodes of cardiac arrest and at least 1 period of Afib/RVR. CTA + PE. Started therapeutic cooling. Course also c/b R PTX on first CXR in ER s/p small bore tube thoracostomy.       In CCU pt stated on ci ointment  Both Eyes BID   Midazolam HCl (VERSED) 2 MG/2ML injection      [COMPLETED] Sodium Chloride 0.9 % solution      [COMPLETED] Sodium Chloride 0.9 % solution      [COMPLETED] Sodium Chloride 0.9 % solution      Pantoprazole Sodium (PROTONIX) 40 mg in limbs all appear to be in reach of hands      LABS    Lab Results  Component Value Date   WBC 23.2 04/20/2018   HGB 12.7 04/20/2018   HCT 39.9 04/20/2018    04/20/2018    04/20/2018   CREATSERUM 1.74 04/20/2018   BUN 21 04/20/2018    04 ADMINISTERED IN LAST 1 DAY:  amiodarone HCl in Dextrose (CORDARONE) 360 MG/200ML premix infusion     Date Action Dose Route User    4/20/2018 1200 Rate/Dose Verify 1 mg/min Intravenous Pepe CRANDALL RN    4/20/2018 1005 Rate/Dose Verify 1 mg/min Intra 4/20/2018 0519 Hi-Risk Rate/Dose Change 6 mcg/kg/min × 71.8 kg (Dosing Weight) Intravenous Waqar Rodríguez RN    4/20/2018 0456 Hi-Risk Rate/Dose Change 7 mcg/kg/min × 71.8 kg (Dosing Weight) Intravenous Waqar Rodríguez RN    4/20/2018 0448 Hi-Risk R 2052 Given 1 mg Intravenous Christiano Brink MD    4/19/2018 2045 Given 1 mg Intravenous Christiano Brink MD      heparin(PORCINE) 52002fqyrc/250mL infusion INITIAL DOSE     Date Action Dose Route User    4/20/2018 0315 Hi-Risk Rate/Dose Change 18 Units/kg/hr 4/20/2018 1005 New Bag (none) Intravenous Cooper Schwarz, RN      LevoFLOXacin in D5W (LEVAQUIN) IVPB premix 750 mg     Date Action Dose Route User    4/20/2018 1100 New Bag 750 mg Intravenous Camille Lisa, RN      Midazolam HCl (VERSED) 2 MG/2ML i Intravenous Traci Ulloa RN    4/20/2018 1535 New Bag 35 mcg/kg/min × 74.3 kg Intravenous Traci Ulloa [facility]    4/20/2018 1518 Rate/Dose Verify 35 mcg/kg/min × 74.3 kg Intravenous Traci Ulloa RN    4/20/2018 1400 Rate/Dose Verify 35 mcg/kg/ Date Action Dose Route User    4/20/2018 0810 Given 20 mL (none) Layo Wharton RN      sodium chloride 0.9 % infusion     Date Action Dose Route User    4/20/2018 0145 New Bag (none) (none) Layo Mote, RN      balsam peru-castor oil (Leah Brit) o

## 2018-04-20 NOTE — PLAN OF CARE
METABOLIC/FLUID AND ELECTROLYTES - ADULT    • Electrolytes maintained within normal limits Not Progressing        SKIN/TISSUE INTEGRITY - ADULT    • Skin integrity remains intact Not Progressing          CARDIOVASCULAR - ADULT    • Maintains optimal cardia

## 2018-04-20 NOTE — CONSULTS
[facility] AND [facility]  [facility] Cardiology Consult Note    Barbie Kelley Patient Status:  Inpatient    1955 MRN [id number]   Location [facility] 2W/SW Attending Black Pollock MD   Hosp Day # 0 PCP Jaron Sweeney MD     63year old male, con 04/20/18 0940  Last data filed at 04/20/18 0600   Gross per 24 hour   Intake          2525.87 ml   Output             1150 ml   Net          1375.87 ml       Physical Exam:     General: intubated and sedated  HEENT: Normocephalic, anicteric sclera, neck rubin

## 2018-04-20 NOTE — PROGRESS NOTES
Pharmacy was consulted to dose Levaquin (levofloxacin) for treatment of pneumonia by Dr. Sabrina Ashley. Body mass index is 22.08 kg/m². Wt Readings from Last 6 Encounters:  04/20/18 : 71.8 kg (158 lb 4.8 oz)  03/21/18 : 74.3 kg (163 lb 12.8 oz)  02/20/18 : 72.

## 2018-04-20 NOTE — CM/SW NOTE
Pt admitted to critical care following cardiac arrest, now with intubation. 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  Pt was sent to [facility] acute rehab following the amputation, under their matt program. Pt was self-pay at that time,

## 2018-04-20 NOTE — WOUND PROGRESS NOTE
WOUND CARE NOTE      PLAN   Recommendations:  Dietary consult for recommendations for nutrition to optimize wound healing  Diabetic Education for optimal glucose control  Turn schedules  Heels elevated using pillows, heel wedge or heel boots to offload h 225 04/20/2018    04/20/2018   CREATSERUM 1.74 (H) 04/20/2018   BUN 21 (H) 04/20/2018    04/20/2018   K 3.3 04/20/2018    04/20/2018   CO2 19 (L) 04/20/2018    (H) 04/20/2018   CA 8.6 04/20/2018   ALB 3.2 (L) 04/20/2018   ALKPHO 1

## 2018-04-20 NOTE — CONSULTS
[facility]    PATIENT'S NAME: Kailee Murphy   ATTENDING PHYSICIAN: Radha Cuellar MD   CONSULTING PHYSICIAN: Aide Lees MD   PATIENT ACCOUNT#:   [id number]    LOCATION:  [address] [place] [address] RECORD #:   [id number]       DATE OF BIRTH:  0 hemorrhagic stroke. ASSESSMENT AND PLAN:  Anoxic encephalopathy with severely suppressed brainstem reflexes, very poor prognosis. We will obtain an EEG and brain death protocol tomorrow. Thank you for the consult.      Dictated By Garett Lomeli

## 2018-04-20 NOTE — CONSULTS
[facility]    Report of Consultation    Date of Admission:  4/19/2018  Date of Consult:  4/20/2018     Reason for Consultation:     CAROLE     History of Present Illness:     Patient is a 61-year-old male with past medical history of brittle Continuous   Midazolam HCl (VERSED) 5 MG/5ML injection 2 mg 2 mg Intravenous Once   Midazolam HCl (VERSED) 5 MG/5ML injection 2 mg 2 mg Intravenous Q1H PRN   Heparin Sodium (Porcine) 1000 UNIT/ML injection 5,740 Units 80 Units/kg Intravenous Once   heparin 82  Resp:  [15-34] 23  BP: (0-197)/(0-106) 96/53  FiO2 (%):  [40 %-100 %] 40 %  SpO2: 97 %     Intake/Output Summary (Last 24 hours) at 04/20/18 1307  Last data filed at 04/20/18 1200   Gross per 24 hour   Intake          3729.87 ml   Output             15 SPECGRAVITY 1.027 04/19/2018   GLUUR 50  04/19/2018   BILUR Negative 04/19/2018   KETUR Trace 04/19/2018   BLOODURINE Negative 04/19/2018   PHURINE 5.0 04/19/2018   PROUR 100  04/19/2018   UROBILINOGEN <2.0 04/19/2018   NITRITE Negative 04/19/2018   LEUU

## 2018-04-20 NOTE — H&P
Pulmonary/Critical Care Admission Note    HPI:   Ana Delgado is a 63year old male with Patient presents with:  Cardiac Arrest (cardiovascular)    Lenore Valera MD    Pt is a 62 yo with uncontrolled DM, PVD, HL, and hyper/hypothyroidism who was foun (PORCINE) drip 23892yjqcy/250mL infusion CONTINUOUS 200-3,000 Units/hr Intravenous Continuous   Midazolam HCl (VERSED) 2 MG/2ML injection      Cisatracurium Besylate (NIMBEX) 200 mg in sodium chloride 0.9 % 200 mL infusion 0.5-10 mcg/kg/min (Dosing Weight) Former Smoker                                                                Packs/day: 0.00      Years: 10.00          Types: Cigars       Quit date: 1/19/1995    Smokeless tobacco: Never Used                        Alcohol use:  No              Drug use: LR   Hep gtt   ECHO   Cards consult   Therapeutic cooling      ARF  In setting of cardiac arrest   Mild worse today  Making urine  Plan IVF   Follow    Renal consult    Resp Failure  Intubated in setting of cardiac arrest  CXR/CT with RUL opacity ?  Aspirat

## 2018-04-20 NOTE — PROGRESS NOTES
04/19/18 2034   Clinical Encounter Type   Visited With Health care provider   Crisis Visit ED  (Full arrest)   Referral From Nurse   Referral To    Patient Spiritual Encounters   Spiritual Assessment Completed 2   RN call to respond to full arre

## 2018-04-20 NOTE — CONSULTS
[facility]    PATIENT'S NAME: Marisol Carrillo   ATTENDING PHYSICIAN: Mili Johnston MD   CONSULTING PHYSICIAN: Brain Chase.  Donna Polanco MD   PATIENT ACCOUNT#:   [id number]    LOCATION:  [place] [address] #:   [id number]       DATE OF BIRTH: his parents. He does some ambulation but not a lot of exercise. He does chew cigars at home and smokes when outside. FAMILY HISTORY:  There is no family history of premature coronary artery disease.      REVIEW OF SYSTEMS:  Unable to be obtained, due occupying 25% of the right hemithorax volume. No tracheal/mediastinal shift was noted. Followup chest x-ray is pending. IMPRESSION:    1.   Out-of-hospital pulseless electrical activity cardiac arrest with significant downtime.   The patient has been

## 2018-04-20 NOTE — DIETARY NOTE
ADULT NUTRITION INITIAL ASSESSMENT    Pt is at high nutrition risk. Pt does not meet malnutrition criteria. RECOMMENDATIONS TO MD:    Potential Tube feeding if consistent with medical plans and pt/family wishes.    RD available for TF recommendation ( [J93.9]   PERTINENT PAST MEDICAL HISTORY: DM, HLP, Thyroid disorder, Old BKA, others as documented. ANTHROPOMETRICS:  HT: 180.3 cm (5' 11\")   --old records 6'6\"    WT: 71.8 kg (158 lb 4.8 oz)  BMI: Body mass index is 18.9 kg/m².          BMI Classifica Adjusted IBW d/t old BKA))    MONITOR AND EVALUATE/NUTRITION GOALS:  - Food and Nutrient Intake:   Monitor: for PO initiation.  - Food and Nutrient Administration:    Monitor: enteral nutrition initiation as safe (if BG more manageable) and or if consisten

## 2018-04-20 NOTE — CONSULTS
Cardiology Consult Kristi Jiang)  Dictated  #[id number]  April 19, 2018    Out of hospital PEA arrest with significant downtime-   Severe metabolic acidosis  Brittle DM  Rapid AF--converted to NSR -on Amiodarone presently  Prolonged QT interval  Acute respiratory

## 2018-04-20 NOTE — PROGRESS NOTES
S/o from Dr. Myron Pathak to f/u on CT brain. Spoke w/ Dr. Zunilda Abraham. We both agree that pt might benefit from chest CTA. Brain CT w/ ? Anoxia. CT chest shows sig thrombotic load w/ evidence of R heart strain. Discussed this with Dr. Zunilda Abraham and Dr. Boaz Stein both.

## 2018-04-20 NOTE — CONSULTS
Consult dictated. Anoxic encephalopathy with severely suppressed, absent brainstem reflexes, very poor prognosis. Patient undergone hypothermic protocol. Will order EEG for tomorrow. Thank you for consult.

## 2018-04-20 NOTE — CONSULTS
[facility] HOSP - [facility]    Report of Consultation    Delorise Baiestrella Patient Status:  Inpatient    1955 MRN [id number]   Location [facility] 2W/SW Attending Malena Redmond MD   Hosp Day # 0 PCP Dariel King MD     Date of Admission infusion, 0.5-8 mcg/min, Intravenous, Continuous  •  Midazolam HCl (VERSED) 5 MG/5ML injection 2 mg, 2 mg, Intravenous, Once  •  Midazolam HCl (VERSED) 5 MG/5ML injection 2 mg, 2 mg, Intravenous, Q1H PRN  •  Heparin Sodium (Porcine) 1000 UNIT/ML injection Nontender.   Extremities: R BKA      Impression and Plan:  Patient Active Problem List:     Diabetic foot ulcer ([facility] [address]     Hyperthyroidism     Sx 7/9/17, CPT 63582, Right BKA w/ Dr. Jamison Plazaumenthal Exp 10/7/17     [facility]     Shandra Bishop

## 2018-04-20 NOTE — PROGRESS NOTES
04/20/18 1130   Readings   Total RR 20   Minute Ventilation (L/min) 10.3 L/min   Expiratory Tidal Volume 500 mL   PIP Observed (cm H2O) 18 cm H2O   MAP (cm H2O) 8   Plateau Pressure (cm H2O) 14 cm H2O   RESPIRATORY THERAPY MECHANICAL VENTILATION PROGRES

## 2018-04-21 NOTE — PLAN OF CARE
0930: Spoke with dr. Sandra Rivera. Order to turn nimbex gtt off for now. If patient starts to shiver during rewarming process, okay to turn nimbex back on. 1130: updated dr. Ciarra Tran. Lactic acid 3.2, trending down.  Nimbex turned back on d/t twitching/shiver

## 2018-04-21 NOTE — PLAN OF CARE
Problem: Diabetes/Glucose Control  Goal: Glucose maintained within prescribed range  INTERVENTIONS:  - Monitor Blood Glucose as ordered  - Assess for signs and symptoms of hyperglycemia and hypoglycemia  - Administer ordered medications to maintain glucose for pressure ulcer development  - Assess and document skin integrity  - Assess and document dressing/incision, wound bed, drain sites and surrounding tissue  - Implement wound care per orders  - Initiate isolation precautions as appropriate  - Initiate Pre

## 2018-04-21 NOTE — PLAN OF CARE
Spoke with Aayush Ramirez at [facility]. Updated on patient status. No neuro reflexes. Patient is being rewarmed at this time and remains on nimbex and propofol gtts. Patient is a candidate for organ tissue and eye donation. Representative will follow up.  Ref

## 2018-04-21 NOTE — PROGRESS NOTES
[facility]     Progress Note        Julieth Don Patient Status:  Inpatient    1955 MRN [id number]   Location [facility] 2W/SW Attending Yossi Corrigan MD   Hosp Day # 1 PCP Deandra Borges MD       Subjective:   Patient Pantoprazole Sodium (PROTONIX) 40 mg in Sodium Chloride 0.9 % 10 mL IV push 40 mg Intravenous Daily   balsam peru-castor oil (VENELEX) ointment  Topical BID PRN   morphINE 1mg/ml infusion 0.5 mg/hr Intravenous Continuous   LevoFLOXacin in D5W (LEVAQUIN) Recommend clinical correlation. MRI of the brain would be more sensitive for further characterization and evaluation of acute ischemia. No acute intracranial hemorrhage mass effect or midline shift. Moderate sinus mucosal disease.     Dictated by (CST): Mor pulmonary infarction and suggests areas of right lung aspiration pneumonia. Correlate clinically. Followup chest radiographs are suggested. Preliminary report was given by [facility].   Dictated by (CST): Justin Gentile MD on 4/20/2018 at 9:40 When compared with ECG of 04/19/2018 21:00:00 ectopic atrial rhythm has replaced atrial fibrillation Electronically signed on 04/20/2018 at 11:40 by Deborah Resendiz MD    Ekg 12-lead    Result Date: 4/19/2018  ECG Report  Interpretation  -------------------

## 2018-04-21 NOTE — PROGRESS NOTES
BG reviewed  Given that the patient is Type 1 Diabetic and has been off insulin for a few hours, we will start dextrose to facilitate insulin administration  Discussed with Dr. Payal Chau. We will start with Dextrose 5 % at 50 cc/hr with 3 amps of HCO3.    Wi

## 2018-04-21 NOTE — PROGRESS NOTES
[facility]    Progress Note    Julieth Don Patient Status:  Inpatient    1955 MRN [id number]   Location [facility] 2W/SW Attending Yossi Corrigan MD   Hosp Day # 1 PCP Deandra Borges MD     Subjective:   Intubated ENDOCRINE SERVICE OF ANY KIND OF NUTRITION ( eg. TUBE FEEDS) IS INITIATED.      Alan Livingston  4/21/2018

## 2018-04-21 NOTE — PROGRESS NOTES
04/21/18 1500   Clinical Encounter Type   Visited With Patient  (pt intubated, no fmaily present, yes chaplains continues to follow up as able)   Routine Visit Follow-up  (prayed for pt at bedside-- for comfort and Gods hand of protection)   Continue Vi

## 2018-04-21 NOTE — PLAN OF CARE
Problem: Diabetes/Glucose Control  Goal: Glucose maintained within prescribed range  INTERVENTIONS:  - Monitor Blood Glucose as ordered  - Assess for signs and symptoms of hyperglycemia and hypoglycemia  - Administer ordered medications to maintain glucose Progressing    Goal: Hemodynamic stability and optimal renal function maintained  INTERVENTIONS:  - Monitor labs and assess for signs and symptoms of volume excess or deficit  - Monitor intake, output and patient weight  - Monitor urine specific gravity, s OG to LIS. Chest tube site remains clean dry and intact. Monitoring output. Nesbitt in place. Monitoring temp per rectal probe and nesbitt probe to compare. Family at bedside; updated on condition and plan of care. [facility] notified.  Spoke with [facility]

## 2018-04-21 NOTE — PROGRESS NOTES
[facility]OS[facility] - [facility]    Cardiology Progress Note    Cristopher Hurt Patient Status:  Inpatient    1955 MRN [id number]   Location [facility] 2W/SW Attending Michael Friend MD   Hosp Day # 1 PCP Anny Hinojosa MD     63year old male,  04/20/2018   CREATSERUM 1.72 (H) 04/20/2018   BUN 23 (H) 04/20/2018    04/20/2018   K 3.3 04/20/2018   K 3.3 04/20/2018    04/20/2018   CO2 20 (L) 04/20/2018    (H) 04/20/2018   CA 8.3 (L) 04/20/2018   ALB 3.2 (L) 04/20/2018 endotracheal tube. Findings discussed with Dr. Cindy Harper on 4/19/18 at 9:37 p.m.    Dictated by (CST): Kvng Thomas MD on 4/19/2018 at 21:33     Approved by (CST): Kvng Thomas MD on 4/19/2018 at 21:37          Ct Chest Pain/pe (iv Only) Em    Result Date: 4/20 preliminary [facility] report.     Dictated by (CST): Eldon Alexandre MD on 4/20/2018 at 10:43     Approved by (CST): Eldon Alexandre MD on 4/20/2018 at 10:56          Ekg 12-lead    Result Date: 4/20/2018  ECG Report  Interpretation  ------------------

## 2018-04-21 NOTE — PROGRESS NOTES
Neurology Inpatient Follow-up Note      HPI:     Interval records reviewed.       Past Medical Hisotory:  Reviewed    Medications:  Reviewed    Allergies:    Clindamycin             Rash  Penicillins             Unknown      ROS:   Unable to obtain from pat

## 2018-04-21 NOTE — PROGRESS NOTES
[facility]    Progress Note      Subjective:     Remain intubated and on morphine gtt. Currently on rewarming protocol . Not on pressors     Review of Systems:     Unable to obtain.  Intubated     Objective:   Pulse:  [62-84] 70  Resp: sodium chloride 0.9 % 200 mL infusion 0.5-10 mcg/kg/min (Dosing Weight) Intravenous Continuous   artificial tears 83-15 % ophthalmic ointment  Both Eyes BID   Pantoprazole Sodium (PROTONIX) 40 mg in Sodium Chloride 0.9 % 10 mL IV push 40 mg Intravenous Keren Kc therapy. Recommended therapeutic ranges for anticoagulant therapy are   as follows:   2.0 - 3.0 All indications except for mechanical prosthetic   cardiac valves.      2.5 - 3.5 Mechanical prosthetic cardiac valves.     ----------  No results for input( Ginny Arenas MD on 4/19/2018 at 21:33     Approved by (CST): Kvng Thomas MD on 4/19/2018 at 21:37          Ct Chest Pain/pe (iv Only) Em    Result Date: 4/20/2018  CONCLUSION:  1. Heart size normal. No significant pericardial thickening/fluid.  No demonstrated c (CST): Leatha Kirby MD on 4/20/2018 at 10:56          Ekg 12-lead    Result Date: 4/20/2018  ECG Report  Interpretation  -------------------------- sinus Rhythm -Nonspecific QRS widening.  - Diffuse nonspecific T-abnormality.  ABNORMAL When compared with Merlyn Phoenix, MD  4/21/2018

## 2018-04-21 NOTE — PROGRESS NOTES
04/21/18 0910   Readings   Total RR 20   Minute Ventilation (L/min) 9.6 L/min   Expiratory Tidal Volume 500 mL   PIP Observed (cm H2O) 20 cm H2O   MAP (cm H2O) 9   Plateau Pressure (cm H2O) 15 cm H2O   RESPIRATORY THERAPY MECHANICAL VENTILATION PROGRESS

## 2018-04-22 NOTE — PROGRESS NOTES
[facility]    Progress Note    Juanito Mis Patient Status:  Inpatient    1955 MRN [id number]   Location [facility] 2W/SW Attending Tristan Walsh MD   Hosp Day # 2 PCP Magan Rodríguez MD     Subjective:   Intubated INITIATED.      Rocío Charles

## 2018-04-22 NOTE — PROGRESS NOTES
[facility]    Cardiology Progress Note    Karel Foy Patient Status:  Inpatient    1955 MRN [id number]   Location [facility] 2W/SW Attending Caleb Barnett MD   Hosp Day # 2 PCP Agustin Bueno MD         Assessment an 2.3 (L) 04/22/2018   ALKPHO 125 (H) 04/22/2018   ALKPHO 125 (H) 04/22/2018   BILT 0.8 04/22/2018   BILT 0.8 04/22/2018   TP 4.6 (L) 04/22/2018   TP 4.6 (L) 04/22/2018    (H) 04/22/2018    (H) 04/22/2018   ALT 89 (H) 04/22/2018   ALT 89 (H) 04

## 2018-04-22 NOTE — PLAN OF CARE
MD NOTIFICATION    MD Notified: Dr. Jara Head    Time: 1400    Reason: patient blood glucose accu check at 1300 was 88, at 1330 was 75 and at 1400 was 80. Insulin gtt off. On d5w @ 75 cc/hr    New Orders: increase d5w to 100 cc/hr. Continue accu checks.  D

## 2018-04-22 NOTE — PLAN OF CARE
Problem: Diabetes/Glucose Control  Goal: Glucose maintained within prescribed range  INTERVENTIONS:  - Monitor Blood Glucose as ordered  - Assess for signs and symptoms of hyperglycemia and hypoglycemia  - Administer ordered medications to maintain glucose document dressing/incision, wound bed, drain sites and surrounding tissue  - Implement wound care per orders  - Initiate isolation precautions as appropriate  - Initiate Pressure Ulcer prevention bundle as indicated   Outcome: Progressing  Skin is clean an

## 2018-04-22 NOTE — PLAN OF CARE
Call [facility] if patient loses all neuro reflexes (currently breathing over the vent), if family decides to withdraw care, or at time of death. Spoke with [employer].      Harjeet Larsen RN, BSN

## 2018-04-22 NOTE — PROGRESS NOTES
Neurology Inpatient Follow-up Note      HPI:     Patient being seen in follow-up. Family (parents, sister) at bedside. Patient does take some spontaneous breaths over event.       Past Medical Hisotory:  Reviewed    Medications:  Reviewed    Allergies: they will continue to discuss amongst themselves regarding patient's care going forward, in accordance with his prior wishes     –Mother inquired about repeat EEG now the patient has been rewarmed; I think this would be reasonable, order placed, to be comp

## 2018-04-22 NOTE — PROGRESS NOTES
[facility]     Progress Note        Sonam Burk Patient Status:  Inpatient    1955 MRN [id number]   Location [facility] 2W/SW Attending Lyndsey Chavez MD   Hosp Day # 2 PCP Moo Gordon MD       Subjective:   Patient PRN   morphINE 1mg/ml infusion 0.5 mg/hr Intravenous Continuous   LevoFLOXacin in D5W (LEVAQUIN) IVPB premix 750 mg 750 mg Intravenous Q48H   Amiodarone HCl (CORDARONE) 450 mg in dextrose 5 % 250 mL infusion 1 mg/min Intravenous Continuous   propofol (DIPR chest tube catheter in right hemithorax unchanged.      Dictated by (CST): Eboni Beckford MD on 4/22/2018 at 9:07     Approved by (CST): Eboni Beckford MD on 4/22/2018 at 9:10          Xr Chest Ap Portable  (cpt=71045)    Result Date: 4/21/2018  C

## 2018-04-22 NOTE — PROCEDURES
EEG report    REFERRING PHYSICIAN: Naveen Dalton MD  PCP and phone number:  Rita Stern MD  [phone]    TECHNIQUE: 21 channels of EEG, 2 channels of EOG, and 1 channel of EKG were recorded utilizing the International 10/20 System.  The ilia of the EEG background of 10-20 µV. Occasional myoclonic jerks accompanied the bursts of discharges. The EEG remained unaltered by various stimuli. IMPRESSION:    This is a markedly abnormal EEG. The EEG reflects a burst-suppression pattern.  This pattern

## 2018-04-22 NOTE — PLAN OF CARE
Problem: CARDIOVASCULAR - ADULT  Goal: Absence of cardiac arrhythmias or at baseline  INTERVENTIONS:  - Continuous cardiac monitoring, monitor vital signs, obtain 12 lead EKG if indicated  - Evaluate effectiveness of antiarrhythmic and heart rate control m infusion. Turning patient and pillows for support. Left heel boot in place. Wound dressings remain intact. Central line clean dry and intact. Chest tube with 9 cc drainage for this shift. Dressing clean dry and intact; remains to wall suction.  OG with 100

## 2018-04-22 NOTE — PROGRESS NOTES
04/22/18 0725   Readings   Total RR 20   Minute Ventilation (L/min) 9.7 L/min   Expiratory Tidal Volume 510 mL   PIP Observed (cm H2O) 20 cm H2O   MAP (cm H2O) 9   Plateau Pressure (cm H2O) 14 cm H2O   RESPIRATORY THERAPY MECHANICAL VENTILATION PROGRESS

## 2018-04-22 NOTE — PROGRESS NOTES
[facility]    Progress Note      Subjective:     Remain intubated and on morphine gtt. Completed rewarming of cooling protocol    Review of Systems:     Unable to obtain.  Intubated     Objective:   Temp:  [96.6 °F (35.9 °C)-98.6 °F (3 Intravenous Continuous   artificial tears 83-15 % ophthalmic ointment  Both Eyes BID   Pantoprazole Sodium (PROTONIX) 40 mg in Sodium Chloride 0.9 % 10 mL IV push 40 mg Intravenous Daily   balsam peru-castor oil (VENELEX) ointment  Topical BID PRN   morphI therapy. Recommended therapeutic ranges for anticoagulant therapy are   as follows:   2.0 - 3.0 All indications except for mechanical prosthetic   cardiac valves.      2.5 - 3.5 Mechanical prosthetic cardiac valves.     ----------  No results for input( pulmonary emboli - on heparin drip  –Echo results noted  –Cardiology and pulmonary input noted  - EEG suggestive of severe anoxic brain injury     Discussed with Dr. Rob Bravo and Nursing.  Discussed with family at bedside     Rachel Barahona MD  4/22/2018

## 2018-04-23 NOTE — PLAN OF CARE
CARDIOVASCULAR - ADULT    • Maintains optimal cardiac output and hemodynamic stability Progressing    • Absence of cardiac arrhythmias or at baseline Progressing    Patient on heart monitor    Diabetes/Glucose Control    • Glucose maintained within prescri

## 2018-04-23 NOTE — PROGRESS NOTES
[facility]    Progress Note      Assessment and Plan:   1. Status post cardiac arrest with severe anoxic encephalopathy–the etiology is uncertain for the initial cause of the arrest.  Patient has stabilized hemodynamically.     Recommendati 2.3 04/23/2018   ALKPHO 130 04/23/2018   ALKPHO 130 04/23/2018   BILT 0.7 04/23/2018   BILT 0.7 04/23/2018   TP 4.7 04/23/2018   TP 4.7 04/23/2018    04/23/2018    04/23/2018   ALT 74 04/23/2018   ALT 74 04/23/2018   PTT 90.1 04/23/2018   MG

## 2018-04-23 NOTE — PROGRESS NOTES
Spoke to Dr. Jj Del Toro and Dr. Varinder Delgado. Received orders to stop Insulin now. Continue Dextrose x1hr  Then stop dextrose. Check BS x1hr for 4 hrs then BS x2 hours.   See [facility]

## 2018-04-23 NOTE — PROGRESS NOTES
Spoke to Dr. Maggie Echavarria informed of blood glucose. Dr. Maggie Echavarria wants insulin drip restarted.   See [facility]

## 2018-04-23 NOTE — PROGRESS NOTES
04/23/18 1625   Clinical Encounter Type   Visited With Family   Routine Visit Follow-up   Continue Visiting Yes   Crisis Visit Critical care   Referral From    Referral To    [facility] Encounters   [facility] Needs Literature   Lesley Ibrahim

## 2018-04-23 NOTE — PROGRESS NOTES
[facility]    Progress Note    Sonam Burk Patient Status:  Inpatient    1955 MRN [id number]   Location [facility] 2W/SW Attending Lyndsey Chavez MD   Hosp Day # 3 PCP Jeannette Negron MD     Subjective:   Intubated 200, in which case the nurse will let the endocrine service know. - Accuchecks q hr  - Hypoglycemia protocol   We will follow.      PLEASE NOTIFY THE ENDOCRINE SERVICE OF ANY KIND OF NUTRITION ( eg. TUBE FEEDS) IS INITIATED.      Adriane Abbott

## 2018-04-23 NOTE — PROGRESS NOTES
Neurology Inpatient Follow-up Note      HPI:     Patient being seen in follow-up. Family (parents, sister) at bedside.         Past Medical Hisotory:  Reviewed    Medications:  Reviewed    Allergies:    Clindamycin             Rash  Penicillins

## 2018-04-23 NOTE — PROGRESS NOTES
Family at bedside requesting to speak to BRADLY Berman 106. Message left for Donnel Mohs on way to see.

## 2018-04-23 NOTE — PROGRESS NOTES
[facility]    Progress Note      Subjective:     Remain intubated - plan for EEG today     Completed rewarming of cooling protocol yesterday    Review of Systems:     Unable to obtain.  Intubated     Objective:   Pulse:  [84-99] 91  Resp: Sodium (PROTONIX) 40 mg in Sodium Chloride 0.9 % 10 mL IV push 40 mg Intravenous Daily   balsam peru-castor oil (VENELEX) ointment  Topical BID PRN   morphINE 1mg/ml infusion 0.5 mg/hr Intravenous Continuous   LevoFLOXacin in D5W (LEVAQUIN) IVPB premix 750 hours.   Recent Labs   Lab  04/22/18   0250  04/22/18   1712  04/23/18   0505   MG  2.1  2.0  2.0        Recent Labs   Lab  04/21/18   0554  04/22/18   0515  04/23/18   0505   ALB  2.5*  2.3*  2.3*  2.3*  2.3*       Xr Chest Ap Portable  (cpt=71045)    Resu Discussed with family at bedside     Kiran Eddy MD  4/23/2018

## 2018-04-23 NOTE — PROGRESS NOTES
Spoke to Dr. Julieth Craft: Per Dr. Julieth Craft keep Blood Sugar Levels between 150-180, Adjust insulin algorithm accordingly. See Nursing Communication order placed. Discussed patient status/labs/plan of care.

## 2018-04-23 NOTE — PAYOR COMM NOTE
--------------  ADMISSION REVIEW     Payor: Dontae Mcneil #:  [id number]  Authorization Number: [id number]    Admit date: 4/20/18  Admit time: 0130             Patient Seen in: [facility] Emergency Department Nursing note and vitals reviewed. ED Course     EKG    Rate, intervals and axes as noted on EKG Report.   Rate: 113  Rhythm: Atrial Fibrillation  Reading: abnormal for rate, abnormal for rhythm      PROCEDURES:  PROCEDURE:  Tube Thoracostomy Plac BKA      Medications (Active prior to today's visit):    No current outpatient prescriptions on file.      Inpatient Medications    [COMPLETED] lactated ringers IV bolus 1,000 mL 1,000 mL Intravenous Once   norepinephrine (LEVOPHED) 4 mg/250 ml premix infus injection  Intravenous Code/Trauma Med   [COMPLETED] calcium chloride injection  Intravenous Code/Trauma Med   amiodarone HCl in Dextrose (CORDARONE) 360 MG/200ML premix infusion 1 mg/min Intravenous Continuous   [COMPLETED] Ciprofloxacin in D5W (CIPRO) IV report pending, residual ptx     ASSESSMENT/PLAN:     Cardiac Arrest  Found down at [address] down time but almost certainly prolonged  Arrest x 3 and [facility] with brief afib/rvr  CTA + PE but no final report and it is not obvious  Now HD stable not Once   Magnesium Sulfate IVPB premix SOLN 2 g 2 g Intravenous Once   norepinephrine (LEVOPHED) 4 mg/250 ml premix infusion 0.5-8 mcg/min Intravenous Continuous   Midazolam HCl (VERSED) 5 MG/5ML injection 2 mg 2 mg Intravenous Q1H PRN   Heparin Sodium (Porc edema  Neurologic: Unarousable to sternal rub  Skin: warm, dry        Results:      Lab Results  Component Value Date   WBC 20.2 04/21/2018   HGB 11.5 04/21/2018   HCT 35.0 04/21/2018    04/21/2018    04/21/2018   CREATSERUM 1.89 04/21/2018 [I.V.:5188]  Out: 4187 [Urine:975; Emesis/NG output:850; Chest Tube:48]                 This shift: No intake/output data recorded.               Vent Settings: Vent Mode: VC/AC  FiO2 (%):  [40 %] 40 %  S RR:  [20] 20  S VT:  [50 mL-500 mL] 50 mL  PEEP/CPA 1000)   • cisatracurium (NIMBEX) infusion Stopped (04/22/18 5271)   • morphINE sulfate 0.5 mg/hr (04/22/18 1000)   • amiodarone 1 mg/min (04/22/18 1000)   • propofol Stopped (04/22/18 0728)         Physical Exam  Constitutional: Sedated, sedated  HEENT: PE 4/21/2018 at 9:55     Approved by (CST): Marquise Finney MD on 4/21/2018 at 10:00                     Assessment   1. Status post cardiac arrest  2. Acute respiratory failure  3. Acute kidney injury  4. Right-sided pneumothorax  5.   Pulmonary embol rhythm,no pathologic murmur  Abd: Abdomen soft, nontender, nondistended,  bowel sounds present  Ext:  no clubbing, no cyanosis  Neuro: no focal deficits  Skin: no rashes or lesions        Scheduled Meds:   • Initial dose Heparin infusion (PE/DVT)  18 Units Right-sided pigtail chest tube catheter in right hemithorax unchanged.      Dictated by (CST): Eitan Jj MD on 4/22/2018 at 9:07     Approved by (CST): Eitan Jj MD on 4/22/2018 at 9:10                   MEDICATIONS ADMINISTERED IN LAST infusion     Date Action Dose Route     4/22/2018 2200 Rate/Dose Verify (none) Intravenous     4/22/2018 2000 Rate/Dose Verify (none) Intravenous     4/22/2018 1900 Rate/Dose Verify (none) Intravenous     4/22/2018 1524 Rate/Dose Change (none) Intravenous 4/23/2018 0602 Hi-Risk Rate/Dose Verify 0.5 mg/hr Intravenous     4/23/2018 0503 Hi-Risk Rate/Dose Verify 0.5 mg/hr Intravenous     4/23/2018 0400 Hi-Risk Rate/Dose Verify 0.5 mg/hr Intravenous     4/23/2018 0300 Hi-Risk Rate/Dose Verify 0.5 mg/hr Intra Intravenous     4/22/2018 2000 Rate/Dose Verify 25 mcg/kg/min × 74.3 kg Intravenous     4/22/2018 1900 Rate/Dose Verify 25 mcg/kg/min × 74.3 kg Intravenous     4/22/2018 1600 Rate/Dose Verify 25 mcg/kg/min × 74.3 kg Intravenous     4/22/2018 1514 New Bag 2

## 2018-04-23 NOTE — CM/SW NOTE
4/23/18  Discharge planning   Family requested to meet with DENZEL.  Met with father,mother and sister at bedside. Family has decided to a terminal wean, Jasson RAMIREZ to contact Dr Nery Gomez.    Family had questions regarding costs of cremation, referral made to [facility]

## 2018-04-23 NOTE — SPIRITUAL CARE NOTE
Nurse called to say that family is considering stopping life support machines for their son Fariha Vzaquez. I talked to them and offered them spiritual care and shared their grief.  They decided to wait till further testing to be done at 11:30 AM.

## 2018-04-23 NOTE — PROGRESS NOTES
[facility]    Cardiology Progress Note    Delorise Baize Patient Status:  Inpatient    1955 MRN [id number]   Location [facility] [place] 2W/SW Attending Malena Redmond MD   Hosp Day # 3 PCP Richie Barragan MD         Assessment an ALKPHO 130 (H) 04/23/2018   ALKPHO 130 (H) 04/23/2018   BILT 0.7 04/23/2018   BILT 0.7 04/23/2018   TP 4.7 (L) 04/23/2018   TP 4.7 (L) 04/23/2018    (H) 04/23/2018    (H) 04/23/2018   ALT 74 (H) 04/23/2018   ALT 74 (H) 04/23/2018   PTT 49. 2

## 2018-04-24 NOTE — PROGRESS NOTES
Patient  @ 0422, no pulse, no respirations, asystole on the monitor, and no bp for patient.   Death verified by 2 nurses (myself, Lala Ventura, ASHLEY & Edvin Shaikh RN) writer also called Dr. Ricci Baker ([facility] nocturalist for the night) to inform him as

## 2018-04-24 NOTE — PROGRESS NOTES
At the start of writers shift, Dr. Js Oates was at patients bedside speaking with patients mother, father and one of his sisters about prognosis and terminal wean. Family agreed.  Writer called [facility] and spoke with [employer] @ Mehrdad Crowell, Dr. Js Oates then spoke with [facility] him

## 2018-04-25 ENCOUNTER — TELEPHONE (OUTPATIENT)
Dept: PULMONOLOGY | Facility: CLINIC | Age: 63
End: 2018-04-25

## 2018-04-25 NOTE — TELEPHONE ENCOUNTER
Adriana/[facility] calling to advise that death cert for Dr to sign, and return, any questions pls call at:[phone],thanks.

## 2018-04-26 NOTE — TELEPHONE ENCOUNTER
Adriana/[facility] notified that death cert has been faxed to [facility]. Lanney Holter also requested death cert to be faxed to their office at [phone]. Death certificate faxed.

## 2018-04-26 NOTE — TELEPHONE ENCOUNTER
Spoke to Maxwell/ Brother in law and notified him that he needs to contact medical records for the death certificate at [phone].

## 2018-04-26 NOTE — TELEPHONE ENCOUNTER
Pt brother in law [facility] is requesting death certificates states he needs 4.  Please call thank you

## 2018-05-07 NOTE — PAYOR COMM NOTE
PATIENT PASSED AWAY ON 4/24      Filed: 4/23/2018 12:06 PM Date of Service: 4/23/2018 11:59 AM Status: Signed   : Apryl Pichardo MD (Physician)   []Manual[]Template  []Copied     [facility]     Progress Note        Subjective:     Cisatracurium Besylate (NIMBEX) 200 mg in sodium chloride 0.9 % 200 mL infusion 0.5-10 mcg/kg/min (Dosing Weight) Intravenous Continuous   artificial tears 83-15 % ophthalmic ointment   Both Eyes BID   Pantoprazole Sodium (PROTONIX) 40 mg in Sodium Chlorid Recommended therapeutic ranges for anticoagulant therapy are   as follows:   2.0 - 3.0 All indications except for mechanical prosthetic   cardiac valves.      2.5 - 3.5 Mechanical prosthetic cardiac valves.      ----------  No results for input(s): BNP in t - completed hypothermic protocol - potassium noted to be high normal   –CT chest results noted for bilateral nonobstructing small pulmonary emboli - on heparin drip  –Echo results noted  –Cardiology and pulmonary input noted  - EEG suggestive of severe ano

## 2018-05-25 NOTE — DISCHARGE SUMMARY
Discharge Summary    Date of Admission: 4/19/2018    Date of Discharge: 4/24/2018    Hospital Course: The patient was admitted to the hospital after pulseless cardiopulmonary arrest.  He suffered severe anoxic encephalopathy.   He was evaluated by multip Blood Strp  Check blood sugar 4 times daily     * SHANNA CONTOUR NEXT TEST Strp  Use test strips to check blood sugar up to 5 times per day     Glucose-Vitamin C 4-0.006 g Chew  Commonly known as:  DEX-4  Chew 4 tablets by mouth every 15 (fifteen) minutes a

## 2022-10-24 NOTE — PROCEDURES
[address]  [address]      Colonoscopy Operative Report    Helene Jones  [id number]  1945      Procedure Type:   Colonoscopy with polypectomy (hot biopsy)     Indications:    Personal history of colon polyps (screening only)       Pre-operative Diagnosis: see indication above    Post-operative Diagnosis:  See findings below    :  Kirstie Mars MD    Surgical Assistant: Endoscopy Technician-1: Marylee Abrahams  Endoscopy RN-1: Deondre Garcia RN    Implants:  None    Referring Provider: Haroon Briseno MD      Sedation:  MAC anesthesia Propofol      Procedure Details:  After informed consent was obtained with all risks and benefits of procedure explained and preoperative exam completed, the patient was taken to the endoscopy suite and placed in the left lateral decubitus position. Upon sequential sedation as per above, a digital rectal exam was performed demonstrating internal hemorrhoids. The Olympus videocolonoscope  was inserted in the rectum and carefully advanced to the cecum, which was identified by the ileocecal valve and appendiceal orifice. The cecum was identified by the ileocecal valve and appendiceal orifice. The quality of preparation was good. The colonoscope was slowly withdrawn with careful evaluation between folds. Retroflexion in the rectum was completed . Findings:   Rectum: normal  Sigmoid: 1 cm sessile polyp, removed by hot biopsies    Descending Colon: normal  Transverse Colon: normal  Ascending Colon: normal  Cecum: normal  Specimen Removed:   as above    Complications: None. EBL:  None. Impression:     see findings    Recommendations: --Await pathology.       Recommendation for next colonscopy in 3 versus 5 years    Signed By: Kirstie Mars MD     10/24/2022  10:06 AM EEG report    REFERRING PHYSICIAN: Cristian Tang MD  PCP and phone number:  Keron Arroyo MD  [phone]    TECHNIQUE: 21 channels of EEG, 2 channels of EOG, and 1 channel of EKG were recorded utilizing the International 10/20 System.  The ilia though the recording was significantly impaired by muscle artifact background suppression is seen and is consistent with severe hypoxic encephalopathy, certain stages of general anesthesia, and drug-induced coma.  No focal, lateralized, or epileptiform feat

## 2023-11-14 NOTE — PROGRESS NOTES
[facility]    Progress Note    Froylan Chadwick Patient Status:  Inpatient    1955 MRN [id number]   Location [facility] 5SW/SE Attending Sue Spaulding MD   Hosp Day # 3 PCP Ibeth Greene MD     Subjective:     Graciela Boeck Pt requesting pain meds. MD Castro made aware. 07/02/2017   CREATSERUM 0.98 07/02/2017   BUN 15 07/02/2017    07/02/2017   K 4.4 07/02/2017    07/02/2017   CO2 28 07/02/2017    (H) 07/02/2017   CA 8.0 (L) 07/02/2017   ALB 2.0 (L) 06/29/2017   ALKPHO 176 (H) 06/29/2017   BILT 1.0 06/2

## 2024-07-19 NOTE — PLAN OF CARE
Diabetes/Glucose Control    • Glucose maintained within prescribed range Not Progressing    Patient's blood sugar for breakfast was 377. MD notified. Plan is to continue working to get blood sugars within normal range prior to discharge.       DISCHARGE PATTY [employer] beth

## 2025-04-11 NOTE — PROGRESS NOTES
[facility]    Progress Note      Assessment and Plan:   1.   Diabetes mellitus out of control–the patient had 2 episodes of hypoglycemia yesterday.     Recommendations: Insulin dosing adjusted by endocrinology consultation, and patient will yes

## (undated) DEVICE — FAN SPRAY KIT: Brand: PULSAVAC®

## (undated) DEVICE — BANDAGE ROLL,100% COTTON, 6 PLY, LARGE: Brand: KERLIX

## (undated) DEVICE — STERILE LATEX POWDER-FREE SURGICAL GLOVESWITH NITRILE COATING: Brand: PROTEXIS

## (undated) DEVICE — STERILE TETRA-FLEX CF, ELASTIC BANDAGE, 4" X 5.5YD: Brand: TETRA-FLEX™CF

## (undated) DEVICE — Device

## (undated) DEVICE — BLADE 10MM SS HIP OSTM RADL

## (undated) DEVICE — SUTURE VICRYL 0 J340H

## (undated) DEVICE — SUTURE MONOCRYL 3-0 Y936H

## (undated) DEVICE — SOL  .9 1000ML BTL

## (undated) DEVICE — DRAPE ARTHROSCOPY KNEE

## (undated) DEVICE — CONTAINER SPEC STR 4OZ GRY LID

## (undated) DEVICE — TOWEL OR BLU 16X26 STRL

## (undated) DEVICE — GAUZE,PACKING STRIP,IODOFORM,1"X5YD,STRL: Brand: CURAD

## (undated) DEVICE — COVER SGL STRL LGHT HNDL BLU

## (undated) DEVICE — CULTURE COLLECT/TRANSPORT SYS

## (undated) DEVICE — PRECISION (9.0 X 0.51 X 31.0MM)

## (undated) DEVICE — GAUZE SPONGES,12 PLY: Brand: CURITY

## (undated) DEVICE — WEBRIL COTTON UNDERCAST PADDING: Brand: WEBRIL

## (undated) DEVICE — ZIMMER® STERILE DISPOSABLE TOURNIQUET CUFF WITH PLC, DUAL PORT, DUAL BLADDER, 18 IN. (46 CM)

## (undated) DEVICE — SUTURE PROLENE 2-0 CT-1

## (undated) DEVICE — SOL  .9 1000ML BAG

## (undated) DEVICE — INTENDED FOR TISSUE SEPARATION, AND OTHER PROCEDURES THAT REQUIRE A SHARP SURGICAL BLADE TO PUNCTURE OR CUT.: Brand: BARD-PARKER ® STAINLESS STEEL BLADES

## (undated) DEVICE — BLADE SAW SAGITTAL 19.5

## (undated) DEVICE — PROXIMATE SKIN STAPLERS (35 WIDE) CONTAINS 35 STAINLESS STEEL STAPLES (FIXED HEAD): Brand: PROXIMATE

## (undated) DEVICE — CHLORAPREP 26ML APPLICATOR

## (undated) DEVICE — SUTURE SILK 0

## (undated) DEVICE — SOL H2O 1000ML BTL

## (undated) DEVICE — BATTERY

## (undated) DEVICE — VERSAJET II HYDROSURGERY SYSTEM HANDSET, 45DEG 8MM EXACT: Brand: VERSAJET

## (undated) DEVICE — SUCTION CANISTER, 3000CC,SAFELINER: Brand: DEROYAL

## (undated) DEVICE — 800CC SUCTION CANISTER WITH HYDROPHOBIC FILTER WITH RED LID: Brand: BEMIS

## (undated) DEVICE — LOWER EXTREMITY: Brand: MEDLINE INDUSTRIES, INC.

## (undated) DEVICE — OCCLUSIVE GAUZE STRIP OVERWRAP,3% BISMUTH TRIBROMOPHENATE IN PETROLATUM BLEND: Brand: XEROFORM

## (undated) DEVICE — ZIMMER® STERILE DISPOSABLE TOURNIQUET CUFF WITH PLC, DUAL PORT, SINGLE BLADDER, 30 IN. (76 CM)

## (undated) DEVICE — SPONGE LAP 18X18 XRAY STRL

## (undated) DEVICE — VERSAJET II HYDROSURGERY SYSTEM HANDSET, 45DEG 14MM EXACT: Brand: VERSAJET

## (undated) DEVICE — DRAPE MN CARM

## (undated) DEVICE — SUTURE ETHILON 3-0 669H

## (undated) DEVICE — REM POLYHESIVE ADULT PATIENT RETURN ELECTRODE: Brand: VALLEYLAB

## (undated) DEVICE — NON-ADHERENT STRIPS,OIL EMULSION: Brand: CURITY

## (undated) DEVICE — CULTURE TUBE ANAEROBIC

## (undated) DEVICE — ABDOMINAL PAD: Brand: CURITY

## (undated) DEVICE — DRAPE SRG 70X60IN SPLT U IMPRV

## (undated) DEVICE — PADDING 4YDX6IN CTTN STRL WBRL

## (undated) DEVICE — SOL  .9 3000ML

## (undated) DEVICE — Device: Brand: STABLECUT®

## (undated) DEVICE — KENDALL SCD EXPRESS SLEEVES, KNEE LENGTH, MEDIUM: Brand: KENDALL SCD

## (undated) DEVICE — 1010 S-DRAPE TOWEL DRAPE 10/BX: Brand: STERI-DRAPE™

## (undated) NOTE — IP AVS SNAPSHOT
Patient Demographics     [facility] Phone  [phone] (Home) *Preferred* E-mail Address  [email or web address]      Emergency Contact(s)     Name Relation Home Work Brittany Sparks Mother [phone]        Allergies as o Take 100 mg by mouth 2 (two) times daily. Richie Barragan MD         Enoxaparin Sodium 40 MG/0.4ML Soln  Commonly known as:  LOVENOX      Inject 0.4 mL (40 mg total) into the skin daily.    Richie Barragan MD         Glucose-Vitamin C 4-0.006 g Rachelle Franklin docusate sodium 100 MG Caps  Enoxaparin Sodium 40 MG/0.4ML Soln  Glucose-Vitamin C 4-0.006 g Chew  Insulin Aspart Pen 100 UNIT/ML Sopn  Insulin Aspart Pen 100 UNIT/ML Sopn  insulin detemir 100 UNIT/ML Sopn  magnesium hydroxide 400 MG/5ML Susp  methimazole Microbiology Results (All)     Procedure Component Value Units Date/Time    MRSA Screen by PCR Once [[id number]]     Order Status:  Sent Lab Status:  No result     Specimen:  Other from Nares     Aerobic Bacterial Culture [[id number]]  (Abnormal) Collected: 1559    Specimen:  Tissue from Foot, right      Tissue Culture Result 2+ growth Staphylococcus aureus (A)      2+ growth Staphylococcus species, not aureus (A)      2+ growth Corynebacterium species (A)     Tissue Smear 2+ WBCs seen      4+ Gram positive c diabetic foot ulcer. The patient describes mild discomfort at the site associated with occasional bleeding and purulence from a wound at the lateral aspect of the right midfoot. He is without fevers, chills, shakes.   He notes that his blood sugars were f Vision normal. Ear nose and throat normal. Bowel normal. Bladder function normal. No depression. No thyroid disease. No rash. Muscles and joints unremarkable. No weight loss no weight gain.     Physical Exam:   Vital Signs:[PC.1]  Blood pressure 140/60, pul admission blood sugar    Recommendations: Insulin management as per endocrinology. 3.  History of hyperthyroidism–the patient has been taking methimazole 5 mg 3 times daily. The TSH is 20.     Recommendations: Decrease methimazole to once daily and endo with his parents when he returns home. PM&R has now been consulted in order to assess patient's functional status and make recommendations for rehabilitation.     ROS:  A full 10 point review of systems was conducted and is negative except those listed in H morphINE sulfate (PF) 2 MG/ML injection 2 mg 2 mg Intravenous Q30 Min PRN   [COMPLETED] insulin aspart (NOVOLOG) 100 UNIT/ML flexpen 6 Units 6 Units Subcutaneous Once   [] Insulin Regular Human (NOVOLIN R) 100 Units in sodium chloride 0.9 % 100 mL i [COMPLETED] insulin aspart (NOVOLOG) 100 UNIT/ML flexpen 10 Units 10 Units Subcutaneous Once   [COMPLETED] insulin aspart (NOVOLOG) 100 UNIT/ML flexpen 12 Units 12 Units Subcutaneous Once   [COMPLETED] insulin aspart (NOVOLOG) 100 UNIT/ML flexpen 3 Units 3 (Oral)   Resp 18   Ht 78\"   Wt 162 lb 11.2 oz   SpO2 99%   BMI 18.80 kg/m²   General: well nourished, NAD  Head: Normocephalic, atraumatic, without obvious abnormality  Eyes: conjunctiva/lids without erythema/icterus, pupils are equal and round  E/N/T: No sore prevention, bowel and bladder management, skin management. Physiatric medical oversight to monitor  pulmonary status, neurologic status, DVT prevention, contracture prevention, leukocytosis, postop anemia, pain managment. ELOS 1-2  Weeks.  Rehab potent was administered through a PICC line. After the foot was removed, these antibiotics were ultimately discontinued. The patient was evaluated by rehabilitation physicians and was accepted by [facility].     Discharge Medications:[PC.1]     Medication lisinopril 5 MG Tabs  Commonly known as:  PRINIVIL,ZESTRIL  TAKE 1 TABLET BY MOUTH EVERY DAY     ULTICARE INSULIN SYRINGE 31G X 5/16\" 1 ML Misc  Generic drug:  Insulin Syringe-Needle U-100  USE AS DIRECTED DAILY        STOP taking these medications    Ins :  Timmy Ferreira PT (Physical Therapist)        PHYSICAL THERAPY TREATMENT NOTE - INPATIENT    Room Number: 467/467-A       Presenting Problem: R BKA    Problem List  Principal Problem:    Diabetic foot ulcer ([facility] [address]  Active Problems:    Type II d BALANCE                                                                                                                     Static Sitting: Good  Dynamic Sitting: Fair +           Static Standing: Fair  Dynamic Standing: Fair -    ACTIVITY TOLERANCE  On ro Goal #2 Patient is able to demonstrate transfers Sit to/from [facility] assistance level: modified independent with walker - rolling   Goal #2  Current Status CGA   Goal #3 Patient is able to ambulate 50 feet with assist device: walker - rolling at assistanc

## (undated) NOTE — IP AVS SNAPSHOT
[facility]            (For Outpatient Use Only) Initial Admit Date: 6/29/2017   Inpt/Obs Admit Date: Inpt: 6/29/17 / Obs: N/A   Discharge Date:    Jazzy Obregon:  [de-identified]   MRN: [de-identified]   CSN: [id number]        [facility]

## (undated) NOTE — LETTER
04/11/18    Susy Julian M.D.  [facility]   [id number]   Stephany Damián   [phone]      Dear Dr. Jenny Aguiloln,    Your Patient, Warren Renee, date of birth 6/16/1955, has been treated at [address] [place] [address] for his wound(s).  He has recent